# Patient Record
Sex: MALE | Race: WHITE | NOT HISPANIC OR LATINO | URBAN - METROPOLITAN AREA
[De-identification: names, ages, dates, MRNs, and addresses within clinical notes are randomized per-mention and may not be internally consistent; named-entity substitution may affect disease eponyms.]

---

## 2024-01-17 PROBLEM — K42.0 UMBILICAL HERNIA WITH OBSTRUCTION: Status: ACTIVE | Noted: 2024-01-17

## 2024-01-17 PROBLEM — K21.9 CHRONIC GERD: Status: ACTIVE | Noted: 2024-01-17

## 2024-01-17 PROBLEM — R19.5 LOOSE STOOLS: Status: ACTIVE | Noted: 2024-01-17

## 2024-01-17 PROBLEM — K55.9 ISCHEMIC COLITIS (MULTI): Status: ACTIVE | Noted: 2024-01-17

## 2024-01-17 PROBLEM — K22.10 EROSIVE ESOPHAGITIS: Status: ACTIVE | Noted: 2024-01-17

## 2024-01-17 PROBLEM — R10.12 LEFT UPPER QUADRANT PAIN: Status: ACTIVE | Noted: 2024-01-17

## 2024-01-17 PROBLEM — K57.92 ACUTE DIVERTICULITIS: Status: ACTIVE | Noted: 2024-01-17

## 2024-01-17 RX ORDER — OMEGA-3 FATTY ACIDS/FISH OIL 340-1000MG
2000 CAPSULE ORAL
COMMUNITY
End: 2024-01-18 | Stop reason: WASHOUT

## 2024-01-17 RX ORDER — MULTIVITAMIN
1 TABLET ORAL DAILY
COMMUNITY
End: 2024-01-18 | Stop reason: WASHOUT

## 2024-01-17 RX ORDER — DICYCLOMINE HYDROCHLORIDE 10 MG/1
CAPSULE ORAL 2 TIMES DAILY
COMMUNITY
Start: 2019-04-09 | End: 2024-01-18 | Stop reason: WASHOUT

## 2024-01-17 RX ORDER — TURMERIC 400 MG
CAPSULE ORAL
COMMUNITY
Start: 2022-06-22 | End: 2024-01-18 | Stop reason: WASHOUT

## 2024-01-17 RX ORDER — ESOMEPRAZOLE MAGNESIUM 40 MG/1
CAPSULE, DELAYED RELEASE ORAL
COMMUNITY

## 2024-01-18 ENCOUNTER — OFFICE VISIT (OUTPATIENT)
Dept: OTOLARYNGOLOGY | Facility: CLINIC | Age: 59
End: 2024-01-18
Payer: COMMERCIAL

## 2024-01-18 VITALS
TEMPERATURE: 97.7 F | HEIGHT: 75 IN | BODY MASS INDEX: 37.42 KG/M2 | DIASTOLIC BLOOD PRESSURE: 97 MMHG | WEIGHT: 301 LBS | HEART RATE: 91 BPM | SYSTOLIC BLOOD PRESSURE: 153 MMHG

## 2024-01-18 DIAGNOSIS — H93.13 TINNITUS OF BOTH EARS: ICD-10-CM

## 2024-01-18 DIAGNOSIS — H73.893 RETRACTION OF TYMPANIC MEMBRANE OF BOTH EARS: ICD-10-CM

## 2024-01-18 DIAGNOSIS — H93.8X3 SENSATION OF PLUGGED EAR ON BOTH SIDES: ICD-10-CM

## 2024-01-18 DIAGNOSIS — H65.92 FLUID LEVEL BEHIND TYMPANIC MEMBRANE OF LEFT EAR: Primary | ICD-10-CM

## 2024-01-18 PROCEDURE — 99213 OFFICE O/P EST LOW 20 MIN: CPT | Performed by: NURSE PRACTITIONER

## 2024-01-18 PROCEDURE — 1036F TOBACCO NON-USER: CPT | Performed by: NURSE PRACTITIONER

## 2024-01-18 PROCEDURE — 99203 OFFICE O/P NEW LOW 30 MIN: CPT | Performed by: NURSE PRACTITIONER

## 2024-01-18 SDOH — ECONOMIC STABILITY: FOOD INSECURITY: WITHIN THE PAST 12 MONTHS, YOU WORRIED THAT YOUR FOOD WOULD RUN OUT BEFORE YOU GOT MONEY TO BUY MORE.: NEVER TRUE

## 2024-01-18 SDOH — ECONOMIC STABILITY: FOOD INSECURITY: WITHIN THE PAST 12 MONTHS, THE FOOD YOU BOUGHT JUST DIDN'T LAST AND YOU DIDN'T HAVE MONEY TO GET MORE.: NEVER TRUE

## 2024-01-18 ASSESSMENT — ENCOUNTER SYMPTOMS
LOSS OF SENSATION IN FEET: 0
OCCASIONAL FEELINGS OF UNSTEADINESS: 0
DEPRESSION: 0

## 2024-01-18 ASSESSMENT — PATIENT HEALTH QUESTIONNAIRE - PHQ9
SUM OF ALL RESPONSES TO PHQ9 QUESTIONS 1 AND 2: 0
2. FEELING DOWN, DEPRESSED OR HOPELESS: NOT AT ALL
1. LITTLE INTEREST OR PLEASURE IN DOING THINGS: NOT AT ALL

## 2024-01-18 ASSESSMENT — COLUMBIA-SUICIDE SEVERITY RATING SCALE - C-SSRS
6. HAVE YOU EVER DONE ANYTHING, STARTED TO DO ANYTHING, OR PREPARED TO DO ANYTHING TO END YOUR LIFE?: NO
2. HAVE YOU ACTUALLY HAD ANY THOUGHTS OF KILLING YOURSELF?: NO
1. IN THE PAST MONTH, HAVE YOU WISHED YOU WERE DEAD OR WISHED YOU COULD GO TO SLEEP AND NOT WAKE UP?: NO

## 2024-01-18 ASSESSMENT — PAIN SCALES - GENERAL: PAINLEVEL: 0-NO PAIN

## 2024-01-18 NOTE — PROGRESS NOTES
Subjective   Patient ID: Kenn Powell is a 58 y.o. male who presents for Sinus Problem (Ears blocked).    HPI  His ears are clogged. This started bothering him after having the flu at the end of November. A couple of weeks was prescribed an antibiotic and then his ears started blocking up. Had some blood in the left ear and pain. Was told he had a ruptured ear drum and treated him with antibiotics and ear drops. Infection cleared but was still having congestion.   The left ear is blocked still, the right has got better. Sounds like he is under water. He is having tinnitus. He is having dizziness. No ear pain.   He uses saline spray and takes Claritin. No intranasal steroid spray.   He had a septoplasty years ago.    Patient Active Problem List   Diagnosis    Umbilical hernia with obstruction    Loose stools    Left upper quadrant pain    Ischemic colitis (CMS/HCC)    Chronic GERD    Erosive esophagitis    Acute diverticulitis     Past Surgical History:   Procedure Laterality Date    OTHER SURGICAL HISTORY  06/22/2022    Umbilical hernia repair    OTHER SURGICAL HISTORY  06/22/2022    Nasal septoplasty    OTHER SURGICAL HISTORY  06/22/2022    Lithotripsy    OTHER SURGICAL HISTORY  06/22/2022    Colonoscopy     Review of Systems    All other systems have been reviewed and are negative for complaints except for those mentioned in history of present illness, past medical history and problem list.    Objective   Physical Exam    Constitutional: No fever, chills, weight loss or weight gain  General appearance: Appears well, well-nourished, well groomed. No acute distress.    Communication: Normal communication    Psychiatric: Oriented to person, place and time. Normal mood and affect.    Neurologic: Cranial nerves II-XII grossly intact and symmetric bilaterally.    Head and Face:  Head: Atraumatic with no masses, lesions or scarring.  Face: Normal symmetry. No scars or deformities.  TMJ: Normal, no trismus.    Eyes:  Conjunctiva not edematous or erythematous.     Right Ear: External inspection of ear with no deformity, scars, or masses. EAC is clear.  TM is intact with no sign of infection, effusion. TM is slightly retracted. No perforation seen.     Left Ear: External inspection of ear with no deformity, scars, or masses. EAC is clear.  TM is intact with no sign of infection. There is maria alejandra colored fluid and fluid level lines noted. TM is also retracted. No perforation seen.     Nose: External inspection of nose: No nasal lesions, lacerations or scars. Anterior rhinoscopy with limited visualization past the inferior turbinates. No tenderness on frontal or maxillary sinus palpation.    Oral Cavity/Mouth: Oral cavity and oropharynx mucosa moist and pink. No lesions or masses. Dentition normal. Tonsils appear normal. Uvula is midline. Tongue with no masses or lesions. Tongue with good mobility. The oropharynx is clear.    Neck: Normal appearing, symmetric, trachea midline.     Cardiovascular: Examination of peripheral vascular system shows no clubbing or cyanosis.    Respiratory: No respiratory distress increased work of breathing. Inspection of the chest with symmetric chest expansion and normal respiratory effort.    Skin: No head and neck rashes.    Lymph nodes: No adenopathy.     Assessment/Plan   Diagnoses and all orders for this visit:  Fluid level behind tympanic membrane of left ear  Retraction of tympanic membrane of both ears  Sensation of plugged ear on both sides  Tinnitus of both ears  - I recommended Flonase 2 sprays each nostril once a day. Patient was given instruction on proper application of the medication by spraying intranasally and aiming towards the outer corners of the eyes. Patient was instructed to avoid the septum due to the risk of nasal bleeding.  -Begin use of Afrin.  Administer two sprays in each nostril twice per day.  Do this for 3 days in a row.  Take one day off.  Do for another 3 days in a row.   Take one day off.  Do for another 3 days in a row, and then stop the medication cycle.  If you do not take a break using Afrin after 3 days of use, rebound nasal congestion may occur.  - Perform exercises that help open the eustachian tube such as yawning, swallowing, and gentle autoinsufflation.   - Follow up in 6 weeks with audiogram and to see Dr. Dan. If left effusion not resolved, may need to perform myringotomy with or without PE tube. If symptoms resolving, he may follow back up with me.     All questions answered to patient satisfaction.        EILEEN Dawn-CNP 01/18/24 3:40 PM

## 2024-01-18 NOTE — PATIENT INSTRUCTIONS
- You have fluid and negative pressure behind you left ear drum. You also have some negative pressure behind your right ear drum.   -I recommended Flonase 2 sprays each nostril once a day. Patient was given instruction on proper application of the medication by spraying intranasally and aiming towards the outer corners of the eyes. Patient was instructed to avoid the septum due to the risk of nasal bleeding.  -Begin use of Afrin.  Administer two sprays in each nostril twice per day.  Do this for 3 days in a row.  Take one day off.  Do for another 3 days in a row.  Take one day off.  Do for another 3 days in a row, and then stop the medication cycle.  If you do not take a break using Afrin after 3 days of use, rebound nasal congestion may occur.  - Perform exercises that help open the eustachian tube such as yawning, swallowing, and gentle autoinsufflation.   - Follow up in 6 weeks with Dr. Dan with a hearing test before. May need myringotomy if fluid not resolved.     Welcome to Ivette Melgoza's clinic. We are here to assist you through your ENT care at [facility].  Ivette is a Nurse Practitioner who specializes in General ENT. This means that she specializes in taking care of patients with usual ENT issues such as nasal congestion, allergy symptoms, sinusitis, hearing loss, ear infections, ear wax removal, hoarseness, sore throat, throat infections, reflux and some swallowing issues. She also sees patients regarding dizziness and vertigo.   Lalitha is Ivette's  and she answers the office phone from 7:30am-4pm Mon-Fri. Call [phone]. She can help you with scheduling of appointments, and general questions and information. You may need to leave a message if she is helping another patient. In this case, someone from the team will call you back the same day if you leave your message before 3pm, or the next business morning.  Ivette currently sees patients at [facility]  [facility] on Mondays, Wednesdays and Thursdays.  She works closely with audiologists to solve issues with hearing. She is also in very close contact with her collaborative physicians. Dr. Fortune, a surgeon who specializes in general ENT and rhinology. She also works closely with otologist (ear surgeon) Dr. Dan and head and neck surgery Dr. Colmenares.   Others who may be included in your care are dieticians, social workers, allergists, gastroenterologists, neurologists, and physical therapists. Ivette will provide these referrals as needed. Please let her know if you would like to request a specific referral.  Ivette makes every effort to run on time for your appointments. Therefore, if you are more than 15 minutes late unrelated to a scan or another appointment such as therapy or audiology, your appointment will need to be rescheduled for another day. We appreciate your understanding.   We look forward to working with you to meet your healthcare goals.

## 2024-03-07 ENCOUNTER — OFFICE VISIT (OUTPATIENT)
Dept: OTOLARYNGOLOGY | Facility: CLINIC | Age: 59
End: 2024-03-07
Payer: COMMERCIAL

## 2024-03-07 ENCOUNTER — CLINICAL SUPPORT (OUTPATIENT)
Dept: AUDIOLOGY | Facility: CLINIC | Age: 59
End: 2024-03-07
Payer: COMMERCIAL

## 2024-03-07 VITALS
SYSTOLIC BLOOD PRESSURE: 158 MMHG | WEIGHT: 304 LBS | DIASTOLIC BLOOD PRESSURE: 102 MMHG | RESPIRATION RATE: 18 BRPM | HEART RATE: 81 BPM | BODY MASS INDEX: 37.02 KG/M2 | TEMPERATURE: 97.8 F | HEIGHT: 76 IN

## 2024-03-07 DIAGNOSIS — H69.92 DYSFUNCTION OF LEFT EUSTACHIAN TUBE: Primary | ICD-10-CM

## 2024-03-07 DIAGNOSIS — H90.A12 CONDUCTIVE HEARING LOSS OF LEFT EAR WITH RESTRICTED HEARING OF RIGHT EAR: ICD-10-CM

## 2024-03-07 DIAGNOSIS — H69.92 DISORDER OF LEFT EUSTACHIAN TUBE: ICD-10-CM

## 2024-03-07 DIAGNOSIS — H90.72 MIXED CONDUCTIVE AND SENSORINEURAL HEARING LOSS OF LEFT EAR WITH UNRESTRICTED HEARING OF RIGHT EAR: ICD-10-CM

## 2024-03-07 DIAGNOSIS — H73.892 RETRACTED TYMPANIC MEMBRANE, LEFT: Primary | ICD-10-CM

## 2024-03-07 PROCEDURE — 92557 COMPREHENSIVE HEARING TEST: CPT | Performed by: AUDIOLOGIST

## 2024-03-07 PROCEDURE — 99213 OFFICE O/P EST LOW 20 MIN: CPT | Performed by: STUDENT IN AN ORGANIZED HEALTH CARE EDUCATION/TRAINING PROGRAM

## 2024-03-07 PROCEDURE — 92567 TYMPANOMETRY: CPT | Performed by: AUDIOLOGIST

## 2024-03-07 PROCEDURE — 1036F TOBACCO NON-USER: CPT | Performed by: STUDENT IN AN ORGANIZED HEALTH CARE EDUCATION/TRAINING PROGRAM

## 2024-03-07 PROCEDURE — 92511 NASOPHARYNGOSCOPY: CPT | Performed by: STUDENT IN AN ORGANIZED HEALTH CARE EDUCATION/TRAINING PROGRAM

## 2024-03-07 ASSESSMENT — PAIN SCALES - GENERAL: PAINLEVEL: 0-NO PAIN

## 2024-03-07 NOTE — LETTER
March 8, 2024     Ivette Melgoza, APRN-CNP  [address]  [facility] [place] [facility] [place] [address]    Patient: Kenn Powell   YOB: 1965   Date of Visit: 3/7/2024       Dear Dr. Ivette Melgoza, APRN-CNP:    Thank you for referring Kenn Powell to me for evaluation. Below are my notes for this consultation.  If you have questions, please do not hesitate to call me. I look forward to following your patient along with you.       Sincerely,     Maximo Dan MD      CC: No Recipients  ______________________________________________________________________________________    CHIEF COMPLAINT:   Chief Complaint   Patient presents with   • Follow-up       HISTORY OF PRESENT ILLNESS: Kenn Powell is a 58 y.o. male who presents today with symptoms of left ear muffled hearing and obstruction.  He reports that this all started near the end of November.  He had significant pain behind his left eardrum.  He then had relief of pain with bloody drainage, presumably secondary to tympanic membrane perforation.  He felt that he continued to have muffled hearing out of the left ear.  He denies a history of ear tubes or ear surgery.  He saw Ivette Melgoza on January 18, 2024.  At that time, there was a middle ear effusion.  He was started on flonase at that time.  He feels that his ear is significantly improved since that time.      PAST MEDICAL HISTORY:   Past Medical History:   Diagnosis Date   • Other specified health status     No pertinent past medical history       PAST SURGICAL HISTORY:   Past Surgical History:   Procedure Laterality Date   • OTHER SURGICAL HISTORY  06/22/2022    Umbilical hernia repair   • OTHER SURGICAL HISTORY  06/22/2022    Nasal septoplasty   • OTHER SURGICAL HISTORY  06/22/2022    Lithotripsy   • OTHER SURGICAL HISTORY  06/22/2022    Colonoscopy       MEDICATIONS:   Current Outpatient Medications:   •  esomeprazole (NexIUM) 40 mg DR capsule, Take by mouth., Disp: ,  "Rfl:   •  esomeprazole magnesium (NEXIUM ORAL), ORAL, DAILY, Disp: , Rfl:     ALLERGIES:   Allergies   Allergen Reactions   • Amoxicillin Anaphylaxis   • Penicillin Anaphylaxis       SOCIAL HISTORY:   reports that he has quit smoking. His smoking use included cigarettes. He has a 10.00 pack-year smoking history. He has never used smokeless tobacco. He reports current alcohol use. He reports that he does not use drugs.    FAMILY HISTORY: family history is not on file.    REVIEW OF SYSTEMS  Review of Systems    PHYSICAL EXAM:    VITALS:   Vitals:    03/07/24 1555   BP: (!) 158/102   Pulse: 81   Resp: 18   Temp: 36.6 °C (97.8 °F)   TempSrc: Temporal   Weight: 138 kg (304 lb)   Height: 1.93 m (6' 4\")        GENERAL: healthy, alert, well developed, well nourished, no distress, cooperative, appears chronologic age    Communicates with normal voice and without hearing aids.    HEAD: atraumatic, normocephalic, no lesions    EYES: normal, PERRLA and EOM's intact    EARS:   Right ear demonstrates a patent external auditory canal with a normal tympanic membrane.    Left ear: demonstrates a patent external auditory canal with a globally retracted tympanic membrane.  There is also a shallow attic retraction.  I can see the full extent of all retraction.  There is no effusion.  Further, he is able to auto insufflate.  Antunez tuning fork test lateralizes midline 512 Hz.   Rinne testing with a 512 Hz tuning fork: Right Air conduction > Bone conduction   Left Air conduction > Bone conduction      NOSE: nose shows no deformity, asymmetry, or inflammation, nasal mucosa normal,     ORAL CAVITY/OROPHARYNX: negative findings: lips normal without lesions, buccal mucosa normal, gums healthy, teeth intact, non-carious, palate normal, tongue midline and normal, soft palate, uvula, and tonsils normal    NECK AND SALIVARY GLANDS: Neck is supple without masses or lymphadenopathy. Palpation of the parotid and submandibular gland reveals no masses " or tenderness to palpation.    CRANIAL NERVES: intact,   Facial nerve exam  is House - Brackmann 1- Normal Function on the right and 1- Normal Function on the left.    CARDIOVASCULAR: No evidence of peripheral edema    PULMONARY: normal lung excursion, no evidence of retractions    DATA REVIEWED:  Audiogram  I reviewed the audiogram from 3/7/2024, which demonstrates normal right hearing with mild very high-frequency sensorineural hearing loss at 8000 Hz only.  On the left, there was mild low-frequency mixed hearing loss rising to normal hearing then sloping to mild sensorineural hearing loss at 8000 DB.  There was a type As tympanogram on the right and a type C tympanogram on the left.  Word recognition score was 100% bilaterally.    Procedure note   Diagnosis: Unilateral Eustachian tube dysfunction  Procedure: Flexible fiberoptic nasopharyngoscopy   : Maximo Dan MD   Procedure: After verbal consent was obtained. Topical afrin and 4% lidocaine was administered via a nebulizer in each nostril. A flexible nasopharyngoscope used to examine both the right and left nasal cavity and the nasopharynx. Findings were as follows: No nasopharyngeal masses or obstruction of the Eustachian tube orifice.      IMPRESSION:   1) left eustachian tube dysfunction and left mixed hearing loss --it appears that the left eustachian tube dysfunction is improving significantly with Flonase.  The patient does not have a middle ear effusion.  The conductive component of the hearing loss is minimal.  Further, the patient is able to auto insufflate.    PLAN:  I discussed options for management of this left eustachian tube dysfunction.  I discussed myringotomy with tube placement.  I discussed the risks and benefits of the procedure including, not limited to, bleeding, pain, infection, tympanic membrane perforation, hearing loss, fullness, need for further procedures.  I also discussed that we could continue medical management with  Flonase given that he has had significant improvement, his audiogram shows minimal conductive hearing loss, and he is able to auto insufflate.  Further, I can see the full extent of all retractions.  I do not think he is developing cholesteatoma.  He would like to proceed with this option.  He will follow-up in 6 weeks to see if he is still having symptoms.  If he is still having symptoms, we will consider a left myringotomy with tube placement at that time.    Maximo Dan MD

## 2024-03-07 NOTE — PROGRESS NOTES
"AUDIOLOGY ADULT AUDIOMETRIC EVALUATION      Name:  Kenn Powell  :  1965  Age:  58 y.o.  Date of Evaluation:  3/7/2024    HISTORY  Reason for visit:  ears plugged  Mr. Powell is seen 3/7/2024 at the request of Ivette Melgoza CNP for an evaluation of hearing.   Patient is seeing Maximo Dan M.D. today.      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    RESULTS   Otoscopic Evaluation: minimal cerumen, hair bilaterally      Immittance Measures (226 Hz probe tone):     Right ear:  Tympanometry is consistent with normal middle ear pressure and normal tympanic membrane mobility.     Left ear: tympanometry is consistent with low middle ear pressure and normal tympanic membrane mobility.    Ipsilateral acoustic reflexes (500-4000 Hz) are present for the right ear for 500-4000 Hz and could not be tested for the left ear due to inadequate seal    Test technique:  standard behavioral technique via insert earphones.  Reliability is good.    Pure Tone Audiometry:  Hearing sensitivity is in the normal hearing to mild hearing loss range bilaterally.   Note left air-bone gaps for 500-1000 Hz.      Speech Audiometry:        Right Ear:  Speech Reception Threshold (SRT) was obtained at 10 dBHL                 Speech " discrimination score was 100% in quiet when words were presented at 60 dBHL      Left Ear:  Speech Reception Threshold (SRT) was obtained at 25 dBHL                 Speech discrimination score was 100% in quiet when words were presented at 75 dBHL    IMPRESSIONS:  Patient is expected to have communication difficulty in adverse listening environments.    Patient is expected to benefit from effective communication strategies.       RECOMMENDATIONS  Continue with otologic follow-up with Maximo Dan M.D.   Reassess hearing in conjunction with medical management.    Continue with medical follow-up as indicated.       PATIENT EDUCATION  Discussed results and recommendations with patient.  Questions were addressed and the patient was encouraged to contact our department should concerns arise.       EDITA García, CCC-A  Licensed Audiologist

## 2024-03-08 NOTE — PROGRESS NOTES
CHIEF COMPLAINT:   Chief Complaint   Patient presents with    Follow-up       HISTORY OF PRESENT ILLNESS: Kenn Powell is a 58 y.o. male who presents today with symptoms of left ear muffled hearing and obstruction.  He reports that this all started near the end of November.  He had significant pain behind his left eardrum.  He then had relief of pain with bloody drainage, presumably secondary to tympanic membrane perforation.  He felt that he continued to have muffled hearing out of the left ear.  He denies a history of ear tubes or ear surgery.  He saw Ivette Melgoza on January 18, 2024.  At that time, there was a middle ear effusion.  He was started on flonase at that time.  He feels that his ear is significantly improved since that time.      PAST MEDICAL HISTORY:   Past Medical History:   Diagnosis Date    Other specified health status     No pertinent past medical history       PAST SURGICAL HISTORY:   Past Surgical History:   Procedure Laterality Date    OTHER SURGICAL HISTORY  06/22/2022    Umbilical hernia repair    OTHER SURGICAL HISTORY  06/22/2022    Nasal septoplasty    OTHER SURGICAL HISTORY  06/22/2022    Lithotripsy    OTHER SURGICAL HISTORY  06/22/2022    Colonoscopy       MEDICATIONS:   Current Outpatient Medications:     esomeprazole (NexIUM) 40 mg DR capsule, Take by mouth., Disp: , Rfl:     esomeprazole magnesium (NEXIUM ORAL), ORAL, DAILY, Disp: , Rfl:     ALLERGIES:   Allergies   Allergen Reactions    Amoxicillin Anaphylaxis    Penicillin Anaphylaxis       SOCIAL HISTORY:   reports that he has quit smoking. His smoking use included cigarettes. He has a 10.00 pack-year smoking history. He has never used smokeless tobacco. He reports current alcohol use. He reports that he does not use drugs.    FAMILY HISTORY: family history is not on file.    REVIEW OF SYSTEMS  Review of Systems    PHYSICAL EXAM:    VITALS:   Vitals:    03/07/24 1555   BP: (!) 158/102   Pulse: 81   Resp: 18   Temp: 36.6 °C  "(97.8 °F)   TempSrc: Temporal   Weight: 138 kg (304 lb)   Height: 1.93 m (6' 4\")        GENERAL: healthy, alert, well developed, well nourished, no distress, cooperative, appears chronologic age    Communicates with normal voice and without hearing aids.    HEAD: atraumatic, normocephalic, no lesions    EYES: normal, PERRLA and EOM's intact    EARS:   Right ear demonstrates a patent external auditory canal with a normal tympanic membrane.    Left ear: demonstrates a patent external auditory canal with a globally retracted tympanic membrane.  There is also a shallow attic retraction.  I can see the full extent of all retraction.  There is no effusion.  Further, he is able to auto insufflate.  Antunez tuning fork test lateralizes midline 512 Hz.   Rinne testing with a 512 Hz tuning fork: Right Air conduction > Bone conduction   Left Air conduction > Bone conduction      NOSE: nose shows no deformity, asymmetry, or inflammation, nasal mucosa normal,     ORAL CAVITY/OROPHARYNX: negative findings: lips normal without lesions, buccal mucosa normal, gums healthy, teeth intact, non-carious, palate normal, tongue midline and normal, soft palate, uvula, and tonsils normal    NECK AND SALIVARY GLANDS: Neck is supple without masses or lymphadenopathy. Palpation of the parotid and submandibular gland reveals no masses or tenderness to palpation.    CRANIAL NERVES: intact,   Facial nerve exam  is House - Brackmann 1- Normal Function on the right and 1- Normal Function on the left.    CARDIOVASCULAR: No evidence of peripheral edema    PULMONARY: normal lung excursion, no evidence of retractions    DATA REVIEWED:  Audiogram  I reviewed the audiogram from 3/7/2024, which demonstrates normal right hearing with mild very high-frequency sensorineural hearing loss at 8000 Hz only.  On the left, there was mild low-frequency mixed hearing loss rising to normal hearing then sloping to mild sensorineural hearing loss at 8000 DB.  There was a " type As tympanogram on the right and a type C tympanogram on the left.  Word recognition score was 100% bilaterally.    Procedure note   Diagnosis: Unilateral Eustachian tube dysfunction  Procedure: Flexible fiberoptic nasopharyngoscopy   : Maximo Dan MD   Procedure: After verbal consent was obtained. Topical afrin and 4% lidocaine was administered via a nebulizer in each nostril. A flexible nasopharyngoscope used to examine both the right and left nasal cavity and the nasopharynx. Findings were as follows: No nasopharyngeal masses or obstruction of the Eustachian tube orifice.      IMPRESSION:   1) left eustachian tube dysfunction and left mixed hearing loss --it appears that the left eustachian tube dysfunction is improving significantly with Flonase.  The patient does not have a middle ear effusion.  The conductive component of the hearing loss is minimal.  Further, the patient is able to auto insufflate.    PLAN:  I discussed options for management of this left eustachian tube dysfunction.  I discussed myringotomy with tube placement.  I discussed the risks and benefits of the procedure including, not limited to, bleeding, pain, infection, tympanic membrane perforation, hearing loss, fullness, need for further procedures.  I also discussed that we could continue medical management with Flonase given that he has had significant improvement, his audiogram shows minimal conductive hearing loss, and he is able to auto insufflate.  Further, I can see the full extent of all retractions.  I do not think he is developing cholesteatoma.  He would like to proceed with this option.  He will follow-up in 6 weeks to see if he is still having symptoms.  If he is still having symptoms, we will consider a left myringotomy with tube placement at that time.    Maximo Dan MD

## 2024-04-18 ENCOUNTER — APPOINTMENT (OUTPATIENT)
Dept: OTOLARYNGOLOGY | Facility: CLINIC | Age: 59
End: 2024-04-18
Payer: COMMERCIAL

## 2024-07-01 ENCOUNTER — HOSPITAL ENCOUNTER (OUTPATIENT)
Dept: RADIOLOGY | Facility: CLINIC | Age: 59
Discharge: HOME | End: 2024-07-01
Payer: COMMERCIAL

## 2024-07-01 ENCOUNTER — APPOINTMENT (OUTPATIENT)
Dept: ORTHOPEDIC SURGERY | Facility: CLINIC | Age: 59
End: 2024-07-01
Payer: COMMERCIAL

## 2024-07-01 DIAGNOSIS — M54.16 LUMBAR RADICULITIS: ICD-10-CM

## 2024-07-01 DIAGNOSIS — M47.814 THORACIC SPONDYLOSIS: ICD-10-CM

## 2024-07-01 DIAGNOSIS — M47.816 LUMBAR SPONDYLOSIS: ICD-10-CM

## 2024-07-01 DIAGNOSIS — M47.816 LUMBAR SPONDYLOSIS: Primary | ICD-10-CM

## 2024-07-01 DIAGNOSIS — M62.830 BACK MUSCLE SPASM: ICD-10-CM

## 2024-07-01 PROCEDURE — 1036F TOBACCO NON-USER: CPT | Performed by: PHYSICAL MEDICINE & REHABILITATION

## 2024-07-01 PROCEDURE — 99204 OFFICE O/P NEW MOD 45 MIN: CPT | Performed by: PHYSICAL MEDICINE & REHABILITATION

## 2024-07-01 PROCEDURE — 72070 X-RAY EXAM THORAC SPINE 2VWS: CPT | Performed by: RADIOLOGY

## 2024-07-01 PROCEDURE — 72110 X-RAY EXAM L-2 SPINE 4/>VWS: CPT | Performed by: RADIOLOGY

## 2024-07-01 PROCEDURE — 72110 X-RAY EXAM L-2 SPINE 4/>VWS: CPT

## 2024-07-01 PROCEDURE — 72070 X-RAY EXAM THORAC SPINE 2VWS: CPT

## 2024-07-01 RX ORDER — METHOCARBAMOL 500 MG/1
TABLET, FILM COATED ORAL
Qty: 60 TABLET | Refills: 1 | Status: SHIPPED | OUTPATIENT
Start: 2024-07-01

## 2024-07-01 RX ORDER — MELOXICAM 15 MG/1
15 TABLET ORAL DAILY PRN
Qty: 30 TABLET | Refills: 1 | Status: SHIPPED | OUTPATIENT
Start: 2024-07-01 | End: 2024-07-31

## 2024-07-01 SDOH — SOCIAL STABILITY: SOCIAL NETWORK: SOCIAL ACTIVITY:: 5

## 2024-07-01 NOTE — PROGRESS NOTES
New Consult/New Patient Note    7/1/2024   No ref. provider found    Assessment: 59-year-old male with acute on chronic bilateral lower back pain  -Suspect lumbar or thoracic discogenic pain with intermittent radiculitis  -Possible component of stenosis    PLAN:  1)  Imaging/Diagnostic Studies: Obtain thoracic and lumbar x-rays to further assess.  Reviewed abdominal CT scan from 2022 which does show mild disc protrusion at L4-5 as well as degenerative disc disease at L5-S1 and thoracic spondylosis  2)  Therapy/Rehabilitation: New consult provided for physical therapy  3)  Pharmacological Management: New Rx provided for meloxicam and Robaxin as needed.  Understands to avoid other NSAIDs and the potential sedate of side effects.  4)  Spine/Surgical Interventions: None at this time.  Advised that he should go to the emergency department or seek surgical care if his pain becomes severe or he has any worsening neurological deficits  5)  Alternative Treatments: May consider alternative treatment options in the future including manipulation (chiropractor versus osteopathic) and/or acupuncture if patient does not obtain optimal relief with initial treatment plan.  6)  Consultations:  physical therapy  7)  Follow -up: 4-6 weeks or PRN if symptoms worsen/do not improve.   8)  Future treatment considerations: Thoracic or lumbar MRI to further assess    Patient advised of the difference between hurt and harm and advised to continue with all normal activities and exercises. Patient verbalized understanding of the above plan and was happy with the care provided.      The above clinical summary has been dictated with voice recognition software. It has not been proofread for grammatical errors, typographical mistakes, or other semantic inconsistencies.    Thank you for visiting our office today. It was our pleasure to take part in your healthcare.     Do not hesitate to call with any questions regarding your plan of care after  leaving at [phone]    To clinicians, thank you very much for this kind referral. It is a privilege to partner with you in the care of your patients. My office would be delighted to assist you with any further consultations or with questions regarding the plan of care outlined. Do not hesitate to call the office or contact me directly.     Sincerely,    NABIL Baez MD  , Physical Medicine and Rehabilitation, Orthopedic Spine  [facility] [employer]  [facility] [place]         Kenn Powell   is a 59 y.o. male who presents with about a month of upper to mid back pain  Location:  thoracolumbar pain, right worse than left  Radiation:  notes some tingling and numbness into both legs somewhat diffusely.  Also with similar pain.   Quality:  burning, stabbing  current 4/10,  at its worst 10/10  Exacerbated by twisting and bending  Relieved by rest  Onset, traumatic event: no recent  Has tried:  HEP, oral steroid taper     Valsalva sign is pos  Grocery cart sign is pos  Smoker:  no  Does wake them at night  Litigation: no    Patient denies bowel/bladder incontinence, denies fever, denies unintentional weight loss, denies clumsiness of hands, feet, or dropping things.  Denies any constitutional or myelopathic symptomatology.      PREVIOUS TREATMENTS  IN THE LAST SIX MONTHS     Active conservative therapy  in the last six months (see below)              1. Physical therapy:  no                                                                                2. Home exercise program after PT yes:                                                      3. A physician supervised home exercise program (HEP):  no              4. Chiropractic Care: prior                                                                 Passive conservative therapy  in the last six months (see below)              1. NSAIDS:  ibuprofen                                                                                                      2. Prescription pain medication: none                                                           3. Acupuncture:  prior                                                                                          4. Tens unit: no     Assistive Devices: none    Work status:       ROS: Other than listed in HPI, PMHX below, and intake paperwork including a 30 point patient-recorded review of symptoms which was personally reviewed and inclusive of no history of unintentional weight loss, change in appetite, significant malaise, fevers, chills, or change in bowel/bladder, shortness of breath, or chest pain.    I have confirmed and edited as necessary Past Medical, Past Surgical, Family, Social History and ROS as obtained by others. These were also obtained on new patient forms.      PHYSICAL EXAM:   GENERAL APPEARANCE:  Well nourished, well developed, and no apparent distress.  NEURO PSYCH: Patient oriented to person, place, Mood pleasant. Benign affect.  MUSCULOSKELETAL and NEUROLOGICAL       VISUAL INSPECTION          THORACIC: WNL           LUMBAR: WNL  SPINE ROM:   LUMBAR ROM: Very limited diffusely      PALPATION:           SPINOUS PROCESS: Nontender midline           PARASPINALS: Tender to bilateral thoracic and lumbar, right worse than left  FACET LOADING: Positive bilateral lumbar  MUSCLE BULK: Normal and symmetrical in the upper & lower extremities.  MUSCLE TONE: Normal  MOTOR: 5/5 in all muscle groups tested in bilateral lower extremities   SENSORY: Normal sensory exam to light touch in the lower extremities  GAIT: Normal.  Able to go up and heels and toes with no sig. weakness.  No sig. balance deficit appreciated  Slump testing: Negative bilateral  PERIPHERAL JOINT ROM:   HIP ROM: Full bilaterally  ROGER/Thigh Thrust/Compression/Courtney Finger:  Negative bilaterally   Hip Exam including thigh thrust and LOG ROLL: Negative bilaterally    DATA REVIEW:   The below  imaging studies were personally reviewed and discussed with the patient.    Medical Decision Making:  The above note constitutes a Moderate to High level of medical decision making based on past data and imaging review, new and chronic symptoms with exacerbation, change in weakness or sensation, new imaging and diagnostic studies ordered, discussion of potential interventional or surgical treatment options, acute or chronic pain that may pose a threat to bodily function.    Past Medical History:   Diagnosis Date    Other specified health status     No pertinent past medical history       Medication Documentation Review Audit       Reviewed by Devon Baez MD (Physician) on 07/01/24 at 1415      Medication Order Taking? Sig Documenting Provider Last Dose Status   esomeprazole (NexIUM) 40 mg DR capsule [id number] No Take by mouth. Historical Provider, MD Taking Active   esomeprazole magnesium (NEXIUM ORAL) [id number] No ORAL, DAILY Historical Provider, MD Taking Active                    Allergies   Allergen Reactions    Amoxicillin Anaphylaxis    Penicillin Anaphylaxis       Social History     Socioeconomic History    Marital status:      Spouse name: Not on file    Number of children: Not on file    Years of education: Not on file    Highest education level: Not on file   Occupational History    Not on file   Tobacco Use    Smoking status: Former     Current packs/day: 1.00     Average packs/day: 1 pack/day for 10.0 years (10.0 ttl pk-yrs)     Types: Cigarettes    Smokeless tobacco: Never    Tobacco comments:     Smoked from age [age]   Substance and Sexual Activity    Alcohol use: Yes     Comment: occasional    Drug use: Never    Sexual activity: Not on file   Other Topics Concern    Not on file   Social History Narrative    Not on file     Social Determinants of Health     Financial Resource Strain: Not on file   Food Insecurity: No Food Insecurity (1/18/2024)    Hunger Vital Sign     Worried About  Running Out of Food in the Last Year: Never true     Ran Out of Food in the Last Year: Never true   Transportation Needs: Not on file   Physical Activity: Not on file   Stress: Not on file   Social Connections: Not on file   Intimate Partner Violence: Not on file   Housing Stability: Not on file       Past Surgical History:   Procedure Laterality Date    OTHER SURGICAL HISTORY  06/22/2022    Umbilical hernia repair    OTHER SURGICAL HISTORY  06/22/2022    Nasal septoplasty    OTHER SURGICAL HISTORY  06/22/2022    Lithotripsy    OTHER SURGICAL HISTORY  06/22/2022    Colonoscopy

## 2024-07-09 ENCOUNTER — APPOINTMENT (OUTPATIENT)
Dept: SURGERY | Facility: CLINIC | Age: 59
End: 2024-07-09
Payer: COMMERCIAL

## 2024-07-09 VITALS
HEIGHT: 76 IN | HEART RATE: 78 BPM | WEIGHT: 308 LBS | TEMPERATURE: 98.2 F | OXYGEN SATURATION: 93 % | SYSTOLIC BLOOD PRESSURE: 172 MMHG | BODY MASS INDEX: 37.51 KG/M2 | RESPIRATION RATE: 14 BRPM | DIASTOLIC BLOOD PRESSURE: 102 MMHG

## 2024-07-09 DIAGNOSIS — M79.18 MUSCULOSKELETAL PAIN: ICD-10-CM

## 2024-07-09 DIAGNOSIS — K42.0 UMBILICAL HERNIA WITH OBSTRUCTION: Primary | ICD-10-CM

## 2024-07-09 PROCEDURE — 99213 OFFICE O/P EST LOW 20 MIN: CPT | Performed by: SURGERY

## 2024-07-10 NOTE — PROGRESS NOTES
History Of Present Illness  HPI patient evaluated today for periumbilical pain.  Patient underwent repair of an incarcerated umbilical hernia approximately 2 years ago without mesh.  He had an uneventful postoperative recovery.  He was informed of the increased risk of recurrence secondary to elevated BMI and failure to use mesh under those circumstances.  He denies any abdominal mass still complains of pulling pain especially with exertion and Valsalva movements.  He is currently undergoing physical therapy for his back and is concerned that exercises may exacerbate or cause a hernia.     Past Medical History  He has a past medical history of Other specified health status.    Surgical History  He has a past surgical history that includes Other surgical history (06/22/2022); Other surgical history (06/22/2022); Other surgical history (06/22/2022); and Other surgical history (06/22/2022).     Social History  He reports that he has quit smoking. His smoking use included cigarettes. He has a 10 pack-year smoking history. He has never used smokeless tobacco. He reports current alcohol use. He reports that he does not use drugs.    Family History  No family history on file.     Allergies  Amoxicillin and Penicillin    Review of Systems 10 review of systems otherwise negative     Visit Vitals  BP (!) 172/102   Pulse 78   Temp 36.8 °C (98.2 °F) (Temporal)   Resp 14        Physical Exam BMI 37.4  GENERAL  MENTAL STATUS - Alert. GENERAL APPEARANCE - Cooperative and well groomed. ORIENTATION - Oriented X4. BUILD & NUTRITION - Well nourished and well developed. HYDRATION - Well hydrated.    INTEGUMENTARY  GENERAL CHARACTERISTICS - Overall examination of the patient´s skin reveals no rashes. COLOR - not icteric. SKIN MOISTURE - normal skin moisture. TEMPERATURE - normal worth is noted.    HEAD AND NECK  HEAD  HEAD SHAPE - Atraumatic and normocephalic.  TRACHEA - midline.  THYROID  GLAND CHARACTERISTICS - normal size and  "consistency and no palpable nodules.    EYE  SCLERA/CONJUNCTIVA - BILATERAL - Anicteric.    CHEST AND LUNG EXAM  AUSCULTATION -  BREATH SOUNDS - Clear.    BREAST - Did not examine.    CARDIOVASCULAR  AUSCULTATION: RHYTHM - Regular. HEART SOUNDS - Normal heart sounds.  MURMURS & OTHER HEART SOUNDS - Auscultation of the heart reveals regular rate and no murmurs.    ABDOMEN  PALPATION/PERCUSSION - Palpation and percussion of the abdomen reveal soft, non tender, no mass and no hepatosplenomegaly.  Infraumbilical scar.  No signs of recurrent umbilical hernia.  No signs of inguinal hernia.    LYMPHATIC  GENERAL LYMPHATICS  BREAST LYMPHATIC EXAM - No cervical adenopathy, supraclavicular adenopathy or axilliary adenopathy.     Extremities without edema    Last Recorded Vitals  Blood pressure (!) 172/102, pulse 78, temperature 36.8 °C (98.2 °F), temperature source Temporal, resp. rate 14, height 1.93 m (6' 4\"), weight 140 kg (308 lb), SpO2 93%.    Relevant Results      XR lumbar spine complete 4+ views    Result Date: 7/2/2024  Interpreted By:  Freddy Rubio, STUDY: XR LUMBAR SPINE COMPLETE 4+ VIEWS; ;  7/1/2024 3:14 pm   INDICATION: Signs/Symptoms:bilateral lower back pain.   COMPARISON: None.   ACCESSION NUMBER(S): [id number]   ORDERING CLINICIAN: ESCOBAR GARCIA   FINDINGS: Lumbar spine, 4 views   There is no fracture. There is no spondylolisthesis. There is moderate facet disease in the lower lumbar spine. Otherwise there is no disc space narrowing or osteophytosis. The prevertebral soft tissues are within normal limits.       Moderate facet disease lower lumbar spine. No acute abnormality   MACRO: None   Signed by: Freddy Rubio 7/2/2024 7:56 PM Dictation workstation:   [id number]    XR thoracic spine 2 views    Result Date: 7/2/2024  Interpreted By:  Freddy Rubio, STUDY: XR THORACIC SPINE 2 VIEWS; ;  7/1/2024 3:11 pm   INDICATION: Signs/Symptoms:mid to lower back pain.   COMPARISON: None.   ACCESSION " NUMBER(S): [id number]   ORDERING CLINICIAN: ESCOBAR GARCIA   FINDINGS: Thoracic spine, two views   There is mild kyphosis of the thoracic spine. There is ossification of the anterior longitudinal ligament in the midthoracic spine consistent with diffuse idiopathic skeletal hyperostosis. No fracture seen. No spondylolisthesis       Diffuse idiopathic skeletal hyperostosis. Kyphotic changes. No definite fracture seen. In the setting of trauma however recommend CT for complete evaluation     MACRO: None   Signed by: Freddy Rubio 7/2/2024 7:55 PM Dictation workstation:   [id number]        @[email or web address] [email or web address]@    Assessment/Plan       Clinically without signs of recurrence.  Advised patient to fully participate in physical therapy.  Advised to continue with self-exam and discussed the findings of a potential hernia.  Advised to seek immediate medical attention for any painful persistent mass over the abdominal wall.  Follow-up as needed mass discomfort       I spent 20 minutes in the professional and overall care of this patient.      Kwasi Zheng MD

## 2024-07-10 NOTE — PATIENT INSTRUCTIONS
Thank you for choosing [facility].  Today's exam reveals no evidence of recurrence of your umbilical hernia.  I recommend full participation in any physical therapy.  Please seek medical attention for any mass that you feel over the abdominal wall.  Please seek immediate medical attention for any painful  consistent with mass over the abdominal wall that cannot be returned to the abdominal cavity.  You may follow-up in the office for any concerns.

## 2024-07-12 ENCOUNTER — APPOINTMENT (OUTPATIENT)
Dept: PRIMARY CARE | Facility: CLINIC | Age: 59
End: 2024-07-12
Payer: COMMERCIAL

## 2024-07-12 VITALS — BODY MASS INDEX: 36.76 KG/M2 | WEIGHT: 302 LBS | SYSTOLIC BLOOD PRESSURE: 162 MMHG | DIASTOLIC BLOOD PRESSURE: 94 MMHG

## 2024-07-12 DIAGNOSIS — Z12.5 ENCOUNTER FOR SCREENING FOR MALIGNANT NEOPLASM OF PROSTATE: ICD-10-CM

## 2024-07-12 DIAGNOSIS — I10 ELEVATED BLOOD PRESSURE READING IN OFFICE WITH DIAGNOSIS OF HYPERTENSION: Primary | ICD-10-CM

## 2024-07-12 DIAGNOSIS — M47.816 LUMBAR SPONDYLOSIS: ICD-10-CM

## 2024-07-12 PROCEDURE — 3077F SYST BP >= 140 MM HG: CPT | Performed by: FAMILY MEDICINE

## 2024-07-12 PROCEDURE — 99203 OFFICE O/P NEW LOW 30 MIN: CPT | Performed by: FAMILY MEDICINE

## 2024-07-12 PROCEDURE — 3080F DIAST BP >= 90 MM HG: CPT | Performed by: FAMILY MEDICINE

## 2024-07-12 NOTE — PROGRESS NOTES
Subjective   Patient ID: Kenn Powell is a 59 y.o. male who presents for Establish Care (3 weeks ago had pain and numbness in the legs. Hx of bulging disc. /Sciatica pain./Back pain.).    HPI   Back pain  Umblical hernia    Schedued with PT for     Review of Systems   All other systems reviewed and are negative.      Objective   BP (!) 162/94   Wt 137 kg (302 lb)   BMI 36.76 kg/m²     Physical Exam  Vitals and nursing note reviewed.   Cardiovascular:      Rate and Rhythm: Normal rate and regular rhythm.   Pulmonary:      Effort: Pulmonary effort is normal.      Breath sounds: Normal breath sounds.   Musculoskeletal:      Cervical back: Neck supple.      Lumbar back: Tenderness present.   Lymphadenopathy:      Cervical: No cervical adenopathy.   Neurological:      Mental Status: He is alert.         Assessment/Plan   Diagnoses and all orders for this visit:  Elevated blood pressure reading in office with diagnosis of hypertension  Comments:  DASH diet.  Orders:  -     CBC and Auto Differential; Future  -     Lipid panel; Future  -     Comprehensive Metabolic Panel; Future  -     Hemoglobin A1c; Future  -     TSH; Future  -     Follow Up In Primary Care - Established; Future  Encounter for screening for malignant neoplasm of prostate  -     PSA; Future  Lumbar spondylosis  Comments:  scheduled for PT, follows with Ortho

## 2024-07-16 ENCOUNTER — LAB (OUTPATIENT)
Dept: LAB | Facility: LAB | Age: 59
End: 2024-07-16
Payer: COMMERCIAL

## 2024-07-16 DIAGNOSIS — Z12.5 ENCOUNTER FOR SCREENING FOR MALIGNANT NEOPLASM OF PROSTATE: ICD-10-CM

## 2024-07-16 DIAGNOSIS — I10 ELEVATED BLOOD PRESSURE READING IN OFFICE WITH DIAGNOSIS OF HYPERTENSION: ICD-10-CM

## 2024-07-16 LAB
ALBUMIN SERPL BCP-MCNC: 4.5 G/DL (ref 3.4–5)
ALP SERPL-CCNC: 98 U/L (ref 33–120)
ALT SERPL W P-5'-P-CCNC: 107 U/L (ref 10–52)
ANION GAP SERPL CALC-SCNC: 11 MMOL/L (ref 10–20)
AST SERPL W P-5'-P-CCNC: 51 U/L (ref 9–39)
BASOPHILS # BLD AUTO: 0.03 X10*3/UL (ref 0–0.1)
BASOPHILS NFR BLD AUTO: 0.6 %
BILIRUB SERPL-MCNC: 0.8 MG/DL (ref 0–1.2)
BUN SERPL-MCNC: 15 MG/DL (ref 6–23)
CALCIUM SERPL-MCNC: 9.2 MG/DL (ref 8.6–10.3)
CHLORIDE SERPL-SCNC: 104 MMOL/L (ref 98–107)
CHOLEST SERPL-MCNC: 220 MG/DL (ref 0–199)
CHOLESTEROL/HDL RATIO: 4.3
CO2 SERPL-SCNC: 29 MMOL/L (ref 21–32)
CREAT SERPL-MCNC: 1 MG/DL (ref 0.5–1.3)
EGFRCR SERPLBLD CKD-EPI 2021: 88 ML/MIN/1.73M*2
EOSINOPHIL # BLD AUTO: 0.13 X10*3/UL (ref 0–0.7)
EOSINOPHIL NFR BLD AUTO: 2.6 %
ERYTHROCYTE [DISTWIDTH] IN BLOOD BY AUTOMATED COUNT: 12.6 % (ref 11.5–14.5)
EST. AVERAGE GLUCOSE BLD GHB EST-MCNC: 123 MG/DL
GLUCOSE SERPL-MCNC: 110 MG/DL (ref 74–99)
HBA1C MFR BLD: 5.9 %
HCT VFR BLD AUTO: 47.7 % (ref 41–52)
HDLC SERPL-MCNC: 51.3 MG/DL
HGB BLD-MCNC: 16.3 G/DL (ref 13.5–17.5)
IMM GRANULOCYTES # BLD AUTO: 0.03 X10*3/UL (ref 0–0.7)
IMM GRANULOCYTES NFR BLD AUTO: 0.6 % (ref 0–0.9)
LDLC SERPL CALC-MCNC: 146 MG/DL
LYMPHOCYTES # BLD AUTO: 1.78 X10*3/UL (ref 1.2–4.8)
LYMPHOCYTES NFR BLD AUTO: 35.7 %
MCH RBC QN AUTO: 30.4 PG (ref 26–34)
MCHC RBC AUTO-ENTMCNC: 34.2 G/DL (ref 32–36)
MCV RBC AUTO: 89 FL (ref 80–100)
MONOCYTES # BLD AUTO: 0.39 X10*3/UL (ref 0.1–1)
MONOCYTES NFR BLD AUTO: 7.8 %
NEUTROPHILS # BLD AUTO: 2.62 X10*3/UL (ref 1.2–7.7)
NEUTROPHILS NFR BLD AUTO: 52.7 %
NON HDL CHOLESTEROL: 169 MG/DL (ref 0–149)
NRBC BLD-RTO: 0 /100 WBCS (ref 0–0)
PLATELET # BLD AUTO: 193 X10*3/UL (ref 150–450)
POTASSIUM SERPL-SCNC: 4.5 MMOL/L (ref 3.5–5.3)
PROT SERPL-MCNC: 6.9 G/DL (ref 6.4–8.2)
PSA SERPL-MCNC: 1.02 NG/ML
RBC # BLD AUTO: 5.36 X10*6/UL (ref 4.5–5.9)
SODIUM SERPL-SCNC: 139 MMOL/L (ref 136–145)
TRIGL SERPL-MCNC: 113 MG/DL (ref 0–149)
TSH SERPL-ACNC: 2.3 MIU/L (ref 0.44–3.98)
VLDL: 23 MG/DL (ref 0–40)
WBC # BLD AUTO: 5 X10*3/UL (ref 4.4–11.3)

## 2024-07-16 PROCEDURE — 80053 COMPREHEN METABOLIC PANEL: CPT

## 2024-07-16 PROCEDURE — 83036 HEMOGLOBIN GLYCOSYLATED A1C: CPT

## 2024-07-16 PROCEDURE — 36415 COLL VENOUS BLD VENIPUNCTURE: CPT

## 2024-07-16 PROCEDURE — 80061 LIPID PANEL: CPT

## 2024-07-16 PROCEDURE — 84443 ASSAY THYROID STIM HORMONE: CPT

## 2024-07-16 PROCEDURE — 84153 ASSAY OF PSA TOTAL: CPT

## 2024-07-16 PROCEDURE — 85025 COMPLETE CBC W/AUTO DIFF WBC: CPT

## 2024-07-22 ENCOUNTER — EVALUATION (OUTPATIENT)
Dept: PHYSICAL THERAPY | Facility: CLINIC | Age: 59
End: 2024-07-22
Payer: COMMERCIAL

## 2024-07-22 VITALS — SYSTOLIC BLOOD PRESSURE: 168 MMHG | DIASTOLIC BLOOD PRESSURE: 118 MMHG | OXYGEN SATURATION: 93 % | HEART RATE: 97 BPM

## 2024-07-22 DIAGNOSIS — M54.16 LUMBAR RADICULITIS: Primary | ICD-10-CM

## 2024-07-22 DIAGNOSIS — M47.816 LUMBAR SPONDYLOSIS: ICD-10-CM

## 2024-07-22 DIAGNOSIS — M47.814 THORACIC SPONDYLOSIS: ICD-10-CM

## 2024-07-22 PROCEDURE — 97530 THERAPEUTIC ACTIVITIES: CPT | Mod: GP

## 2024-07-22 PROCEDURE — 97162 PT EVAL MOD COMPLEX 30 MIN: CPT | Mod: GP

## 2024-07-22 ASSESSMENT — PATIENT HEALTH QUESTIONNAIRE - PHQ9
8. MOVING OR SPEAKING SO SLOWLY THAT OTHER PEOPLE COULD HAVE NOTICED. OR THE OPPOSITE, BEING SO FIGETY OR RESTLESS THAT YOU HAVE BEEN MOVING AROUND A LOT MORE THAN USUAL: SEVERAL DAYS
2. FEELING DOWN, DEPRESSED OR HOPELESS: MORE THAN HALF THE DAYS
SUM OF ALL RESPONSES TO PHQ QUESTIONS 1-9: 14
4. FEELING TIRED OR HAVING LITTLE ENERGY: MORE THAN HALF THE DAYS
5. POOR APPETITE OR OVEREATING: MORE THAN HALF THE DAYS
SUM OF ALL RESPONSES TO PHQ9 QUESTIONS 1 AND 2: 4
3. TROUBLE FALLING OR STAYING ASLEEP OR SLEEPING TOO MUCH: MORE THAN HALF THE DAYS
10. IF YOU CHECKED OFF ANY PROBLEMS, HOW DIFFICULT HAVE THESE PROBLEMS MADE IT FOR YOU TO DO YOUR WORK, TAKE CARE OF THINGS AT HOME, OR GET ALONG WITH OTHER PEOPLE: SOMEWHAT DIFFICULT
1. LITTLE INTEREST OR PLEASURE IN DOING THINGS: MORE THAN HALF THE DAYS
9. THOUGHTS THAT YOU WOULD BE BETTER OFF DEAD, OR OF HURTING YOURSELF: NOT AT ALL
6. FEELING BAD ABOUT YOURSELF - OR THAT YOU ARE A FAILURE OR HAVE LET YOURSELF OR YOUR FAMILY DOWN: SEVERAL DAYS
7. TROUBLE CONCENTRATING ON THINGS, SUCH AS READING THE NEWSPAPER OR WATCHING TELEVISION: MORE THAN HALF THE DAYS

## 2024-07-22 ASSESSMENT — ACTIVITIES OF DAILY LIVING (ADL): ADL_ASSISTANCE: INDEPENDENT

## 2024-07-22 ASSESSMENT — ENCOUNTER SYMPTOMS
DEPRESSION: 1
OCCASIONAL FEELINGS OF UNSTEADINESS: 1
LOSS OF SENSATION IN FEET: 1

## 2024-07-22 ASSESSMENT — PAIN - FUNCTIONAL ASSESSMENT: PAIN_FUNCTIONAL_ASSESSMENT: 0-10

## 2024-07-22 ASSESSMENT — PAIN SCALES - GENERAL: PAINLEVEL_OUTOF10: 6

## 2024-07-22 ASSESSMENT — COLUMBIA-SUICIDE SEVERITY RATING SCALE - C-SSRS
2. HAVE YOU ACTUALLY HAD ANY THOUGHTS OF KILLING YOURSELF?: NO
1. IN THE PAST MONTH, HAVE YOU WISHED YOU WERE DEAD OR WISHED YOU COULD GO TO SLEEP AND NOT WAKE UP?: NO
6. HAVE YOU EVER DONE ANYTHING, STARTED TO DO ANYTHING, OR PREPARED TO DO ANYTHING TO END YOUR LIFE?: NO

## 2024-07-22 ASSESSMENT — PAIN DESCRIPTION - DESCRIPTORS: DESCRIPTORS: PRESSURE;THROBBING

## 2024-07-22 NOTE — PROGRESS NOTES
Physical Therapy Evaluation     Patient Name: Kenn Powell  MRN: [id number]  Today's Date: 7/22/2024  Time Calculation  Start Time: 0405  Stop Time: 0456  Time Calculation (min): 51 min  Billing:  Evaluation:   Evaluation: (Medium): 30   Treatment:   Therapeutic Activity:  21     Insurance  Visit #:  1    Insurance Reviewed (per information provided by  pre-cert team)  Authorization required:  No auth, 40 PT/OT     Pt will have transfer of care from [facility] to [facility] Physical Therapist. Re-Check and continuation of care will be completed by said physical therapist      Assessment:  PT Assessment  PT Assessment Results: Decreased strength, Decreased mobility, Pain, Decreased range of motion  Rehab Prognosis: Good  Evaluation/Treatment Tolerance: Patient limited by pain   59yr M pt presents to physical therapy with complaints of thoracic and lumbar pain that began around 2 months ago. During examination his blood pressure was read 2x - both times hypertensive with recommendations and strong encouragement for patient to go to the ED/Urgent Care. Pt denied wanting to seek further medical assistance because he does not want to start taking blood pressure medications. During the physical portion of the examination patient had decreased LE strength, functional mobility, tolerance with activity, gait/stair dysfunction. Patient is concerned about his co-pay each visit - it was discussed for the patient to come to 2-3 visits spread out and receive an HEP so the patient can complete exercises IND at home. Patient was educated on the [place] [facility] pool as this would be a great source if patient is able to get relief in our therapeutic pool. Pt is SAFE to participate in aquatics as can demonstrate proper use of stairs without assistance from therapist and is aware of the precautions/contraindications prior to attending aquatic therapy. pt would benefit from  skilled physical therapy to maximize  pt safety, increase tolerance to activity and to address impairments to restore PLOF with ADLs, functional mobility and participation in activities.      Plan:  OP PT Plan  Treatment/Interventions: Aquatic therapy, Education/ Instruction  PT Plan: Skilled PT  PT Frequency:  (1-3 Visits spaced out)  Onset Date: 07/01/24  Rehab Potential: Good  Plan of Care Agreement: Patient  NV: CHECK BP, start aqua program and print out HEP so he can start at [facility], make sure he has a form as well     Current Problem:   Problem List Items Addressed This Visit             ICD-10-CM    Lumbar spondylosis M47.816    Relevant Orders    Follow Up In Physical Therapy    Lumbar radiculitis - Primary M54.16    Relevant Orders    Follow Up In Physical Therapy    Thoracic spondylosis M47.814    Relevant Orders    Follow Up In Physical Therapy     Imaging: X-RAY   Lumbar Spine (7/1/24): Moderate facet disease lower lumbar spine. No acute abnormality    Thoracic Spine (7/1/24): Diffuse idiopathic skeletal hyperostosis. Kyphotic changes. No definite fracture seen. In the setting of trauma however recommend; CT for complete evaluation      Subjective    General:  General  Reason for Referral: Lumbar/Thoracic Pain  Referred By: MD Sasha  Past Medical History Relevant to Rehab: HTN, liver disease; Vertebral compression fz (1988); see med chart for more detail  Preferred Learning Style: verbal, visual, written  General Comment: Reports about 2 months ago he went to get out of the bed and from the waist down he had a heavy sensation in the low to mid back and had radiating numbness/tingling making it difficult to walk. Reports it has diminished minimally. States he can complete cat cows, trunk rotation gives some relief temporarily. 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 States he also has had problems with the numbness/sciatica. Completing grass cutting is difficult now and has 13 cubic feet of mulch and states he is going  into the house  basically in a coma. Yard work/house work is difficult. Up/Down stairs is difficult. laundry is difficult. Used to be able to complete car maintance and now cannot complete. Works in manufacturing engineering - he states he is good stand for about 10 min and then has to shift his leg. Chiropracter, TENs, and Heat really helped with his symptoms.  Precautions:  Precautions  STEADI Fall Risk Score (The score of 4 or more indicates an increased risk of falling): 7  Hearing/Visual Limitations: Glasses  Vital Signs:  Vital Signs  Heart Rate: 97  Heart Rate Source: Monitor  SpO2: 93 %  BP: (!) 168/118 (Reports that he is not on BP meds and does not want to go get further medically evaluated at urgent care)  BP Location: Right arm  BP Method: Automatic  Patient Position: Sitting  BP At end of session: 179/118mmHg    Pain:  Pain Assessment  Pain Assessment: 0-10  0-10 (Numeric) Pain Score: 6  Pain Type: Chronic pain  Pain Location: Back  Pain Radiating Towards: Radiating down both legs  Pain Descriptors: Pressure, Throbbing (Electrical)  Pain Frequency: Constant/continuous  Clinical Progression: Not changed  Effect of Pain on Daily Activities: Unable to complete daily activties at the same level that he used too; cannot work on his kids cars  Home Living:  Home Living  Home Living Comment: [place] home; laundry in basement with 10 steps with a HR; 2 steps to enter the house and 2 steps into the kitchen; walk-in shower; 1 grab bar. Has access to cane, walker , and crutches.  Prior Level of Function:  Prior Function Per Pt/Caregiver Report  Level of [place]: Independent with ADLs and functional transfers, Independent with homemaking with ambulation  Receives Help From: Other (Comment) (Kids and wife unable to assist)  ADL Assistance: Independent  Homemaking Assistance: Independent  Ambulatory Assistance: Independent  Vocational: Full time employment ()  Leisure: Enjoys fixing cars, cutting grass  Hand Dominance:  Right    Objective   Functional Assessments:  Gait: The patient is ambulating with the following device: he is not using a device.. Gait deviations include: Moderately antalgic, Decreased velocity, Decreased stride length, Wide base of support, and Forward flexed.  Stair Negotiation: Ascends and descends reciprocally with the use of BL Rail   Sit to Stand Transfers: independent  Bed Mobility: independent     Lumbar Spine  Lumbar Palpation/Joint Mobility Assessment  Palpation/Joint Mobility Comment: Pain with palpation to the lumbar vertebra and lower T/S  Lumbar AROM  Lumbar flexion: (60°): Able to touch his mid shin BL; painful in the lumbar spine  Lumbar extension (25°): 10; more painful then bending backwards  Lumbar rotation right (30°): WFL; pain to the R  Lumbar rotation left (30°): WFL  Lumbar sidebend right (25°): Max def - unable to slide past lateral upper thigh; painful  Lumbar sidebend left (25°): Max def - unable to slide past lateral upper thigh; painful  LE AROM:   Seated: WFL     Hip PROM:   WFL - pain in the L LE with ADD   Lumbar Myotomes  Lumbar Myotomes WFL: yes (BL Hip ABD: 4-5; BL Hip ADD: 4-/5)  R Hip Flex : 4/5  L Hip Flex (L2): (5/5): 4/5  R Knee Ext (L3): (5/5): 4/5  L Knee Ext (L3): (5/5) : 4/5  R Ankle DF (L4): (5/5): 4+/5  L Ankle DF (L4): (5/5): 5/5  R Great Toe Ext (L5): (5/5) : 5/5  L Great Toe Ext (L5): (5/5): 5/5  Specific Lower Extremity MMT  R Hip External Rotation: (5/5): 4/5 painful  L Hip External Rotation: (5/5): 4+/5   Dermatomes  Dermatomes WFL: yes  R Anterior / Medial thigh ( L2, L3): WFL  L Anterior / Medial thigh ( L2, L3): WFL  R Medial ankle (L4): WFL  L Medial ankle (L4): WFL  R Lateral calf/dorsal/plantar surface of foot, digit 1-4 (L5): WFL  L Lateral calf/dorsal/plantar surface of foot, digit 1-4 (L5): WFL  R Digit 5 (S1): WFL  L Digit 5 (S1): WFL  Special Tests  Supine SLR: (Negative): Negative BL  Slump: (Negative): (+) BL     Outcome Measures:  Other  Measures  Oswestry Disablity Index (GERRY): 54     Outcome Measures  Evaluation: 7/22/24   TUG   Age Group    Normal Time in Seconds  (95% Confidence Interval)   60 - 69 years:        8.1 seconds          (7.1 - 9.0)                  70 - 79 years :       9.2 seconds          (8.2 - 10.2)               80 - 99 years:       11.3 seconds        (10.0 - 12.7)             Community Dwelling Frail Older Adults  > 12-14 seconds  associated with high fall risk                 Post-op hip fracture patients at time of discharge  > 24 seconds predictive of falls within 6 months after hip fracture                   Frail older adults > 30 predictive of requiring assistive device for ambulation and being dependent in ADLs              8.91s          Treatments:  Therapeutic Activity: Moderate Complex; Changing/evolving conditions, more than >2 co morbidities   Educated the patient on the Benefits of Aquatics; precautions and contraindications   2. Functional Performance via gait analysis of TUG: Verbal cues for sequencing/positioning. 2x10' at SBA.   3. Functional mobility via AROM/PROM of LE; Verbal cues for sequencing/positioning.  4. Functional Performance via MMT of LE: Hip, knee, ankle; Verbal cues for sequencing/positioning.  5.  Functional Performance via AROM of L/S; VC for proper sequencing/positioning   6. Functional Performance via stairs: ascends/descends 4 6in steps in a reciprocal pattern with BL use of handrails   7. Educated pt on POC, goals of physical therapy, purpose of physical therapy, as well as the benefits in participating in physical therapy to increase functional mobility and maximize pt safety.      EDUCATION:  Outpatient Education  Individual(s) Educated: Patient  Education Provided: Anatomy, Body Mechanics, Fall Risk, Home Safety, POC, Physiology, Posture  Community Resources: [employer] and other Community pools in the local area  Risk and Benefits Discussed with  Patient/Caregiver/Other: yes  Patient/Caregiver Demonstrated Understanding: yes  Plan of Care Discussed and Agreed Upon: yes  Patient Response to Education: Patient/Caregiver Verbalized Understanding of Information, Patient/Caregiver Performed Return Demonstration of Exercises/Activities, Patient/Caregiver Asked Appropriate Questions    Goals:  STG: Pt will be independent in HEP & symptom management    LTGs:  Pain: Pt will demonstrate 2pt improvement on the VAS pain scale, allowing for improved tolerance of functional activities.     ROM: Pt will demonstrate no losses in all lumbar AROM, without onset of pain, allowing for the ability to perform functional activities.     Strength: pt will improve LE strength to >/= 4+/5 to improve functional strength/mobility    Function: Pt will not have exacerbations of symptoms during standing for 20min     Function: Pt will meet above goals allowing pt to return to mowing his lawn with decreased symptoms     GERRY: pt will improve Oswestry (GERRY) score by at least 12 points (minimal clinically important difference) in order to reflect improvement in ADL's/pain reduction. Baseline 27/50, (GERRY score of </=10/50 (</=20%) represents minimal to no disability)    TUG: pt will complete TUG within 12 seconds or less (indicative of higher fall risk) in order to INC balance and enhance safety during ADLs/ IADLs. (> or equal to 12 seconds indicates high risk for falls in older adults. 11-20 seconds is normal for the frail and elderly.) OR MCID 3.4s Baseline 8.91 sec    Stairs: pt will ascend/descend step over step (6in step) with proper gait mechanics and use of <1HR to promote functional mobility and improve functional performance.

## 2024-07-25 ENCOUNTER — OFFICE VISIT (OUTPATIENT)
Dept: PRIMARY CARE | Facility: CLINIC | Age: 59
End: 2024-07-25
Payer: COMMERCIAL

## 2024-07-25 VITALS
BODY MASS INDEX: 37 KG/M2 | WEIGHT: 304 LBS | OXYGEN SATURATION: 99 % | HEART RATE: 99 BPM | SYSTOLIC BLOOD PRESSURE: 170 MMHG | RESPIRATION RATE: 18 BRPM | TEMPERATURE: 97.3 F | DIASTOLIC BLOOD PRESSURE: 98 MMHG

## 2024-07-25 DIAGNOSIS — E88.810 METABOLIC SYNDROME: ICD-10-CM

## 2024-07-25 DIAGNOSIS — I10 ACCELERATED HYPERTENSION: Primary | ICD-10-CM

## 2024-07-25 PROCEDURE — 99213 OFFICE O/P EST LOW 20 MIN: CPT | Performed by: FAMILY MEDICINE

## 2024-07-25 PROCEDURE — 1036F TOBACCO NON-USER: CPT | Performed by: FAMILY MEDICINE

## 2024-07-25 PROCEDURE — 3077F SYST BP >= 140 MM HG: CPT | Performed by: FAMILY MEDICINE

## 2024-07-25 PROCEDURE — 3080F DIAST BP >= 90 MM HG: CPT | Performed by: FAMILY MEDICINE

## 2024-07-25 RX ORDER — LOSARTAN POTASSIUM AND HYDROCHLOROTHIAZIDE 12.5; 5 MG/1; MG/1
1 TABLET ORAL DAILY
Qty: 90 TABLET | Refills: 0 | Status: SHIPPED | OUTPATIENT
Start: 2024-07-25 | End: 2024-10-23

## 2024-07-25 RX ORDER — BISMUTH SUBSALICYLATE 262 MG
1 TABLET,CHEWABLE ORAL DAILY
COMMUNITY

## 2024-07-25 ASSESSMENT — PATIENT HEALTH QUESTIONNAIRE - PHQ9
2. FEELING DOWN, DEPRESSED OR HOPELESS: NOT AT ALL
SUM OF ALL RESPONSES TO PHQ9 QUESTIONS 1 AND 2: 0
1. LITTLE INTEREST OR PLEASURE IN DOING THINGS: NOT AT ALL

## 2024-07-25 ASSESSMENT — ENCOUNTER SYMPTOMS: HYPERTENSION: 1

## 2024-07-25 ASSESSMENT — PAIN SCALES - GENERAL: PAINLEVEL: 6

## 2024-07-25 NOTE — PROGRESS NOTES
Subjective   Patient ID: Kenn Powell is a 59 y.o. male who presents for Follow-up and Hypertension.    Hypertension         BP ranges from 190-140/110-90  No symptoms   Previpous years it was never > 140/80  Never been on BP medications.  Review of Systems   All other systems reviewed and are negative.      Objective   BP (!) 170/98 (BP Location: Left arm, Patient Position: Sitting, BP Cuff Size: Large adult)   Pulse 99   Temp 36.3 °C (97.3 °F) (Temporal)   Resp 18   Wt 138 kg (304 lb)   SpO2 99%   BMI 37.00 kg/m²     Physical Exam  Vitals and nursing note reviewed.   Cardiovascular:      Rate and Rhythm: Normal rate and regular rhythm.   Pulmonary:      Effort: Pulmonary effort is normal.      Breath sounds: Normal breath sounds.   Musculoskeletal:      Cervical back: Neck supple.   Lymphadenopathy:      Cervical: No cervical adenopathy.   Neurological:      Mental Status: He is alert.         Assessment/Plan   Diagnoses and all orders for this visit:  Accelerated hypertension  -     losartan-hydrochlorothiazide (Hyzaar) 50-12.5 mg tablet; Take 1 tablet by mouth once daily.  Metabolic syndrome  Comments:  discussed diet and LSC . add GLP 1 at next visit.  Other orders  -     Follow Up In Primary Care - Established; Future

## 2024-07-29 ENCOUNTER — APPOINTMENT (OUTPATIENT)
Dept: PRIMARY CARE | Facility: CLINIC | Age: 59
End: 2024-07-29
Payer: COMMERCIAL

## 2024-08-11 ASSESSMENT — ENCOUNTER SYMPTOMS
HEADACHES: 1
BLURRED VISION: 0
PND: 0
NECK PAIN: 1
ORTHOPNEA: 0
SHORTNESS OF BREATH: 0
HYPERTENSION: 1
SWEATS: 0
PALPITATIONS: 0

## 2024-08-12 ENCOUNTER — APPOINTMENT (OUTPATIENT)
Dept: PRIMARY CARE | Facility: CLINIC | Age: 59
End: 2024-08-12
Payer: COMMERCIAL

## 2024-08-12 ENCOUNTER — TELEPHONE (OUTPATIENT)
Dept: PRIMARY CARE | Facility: CLINIC | Age: 59
End: 2024-08-12

## 2024-08-12 NOTE — TELEPHONE ENCOUNTER
Patient having GI issues and was unable to connect for today's virtual visit(8/12/24) due to  problems. Patient has been on liquid diet and is going to reach out to  via my-chart also was given option to go to Urgent care or E.R. for treatment.

## 2024-08-19 ENCOUNTER — APPOINTMENT (OUTPATIENT)
Dept: ORTHOPEDIC SURGERY | Facility: CLINIC | Age: 59
End: 2024-08-19
Payer: COMMERCIAL

## 2024-08-23 ENCOUNTER — APPOINTMENT (OUTPATIENT)
Dept: PRIMARY CARE | Facility: CLINIC | Age: 59
End: 2024-08-23
Payer: COMMERCIAL

## 2024-08-26 ENCOUNTER — APPOINTMENT (OUTPATIENT)
Dept: PRIMARY CARE | Facility: CLINIC | Age: 59
End: 2024-08-26
Payer: COMMERCIAL

## 2024-08-26 VITALS
TEMPERATURE: 97.3 F | BODY MASS INDEX: 35.97 KG/M2 | RESPIRATION RATE: 18 BRPM | OXYGEN SATURATION: 94 % | WEIGHT: 295.5 LBS | DIASTOLIC BLOOD PRESSURE: 83 MMHG | SYSTOLIC BLOOD PRESSURE: 116 MMHG | HEART RATE: 102 BPM

## 2024-08-26 DIAGNOSIS — I10 HTN (HYPERTENSION), BENIGN: ICD-10-CM

## 2024-08-26 PROCEDURE — 3079F DIAST BP 80-89 MM HG: CPT | Performed by: FAMILY MEDICINE

## 2024-08-26 PROCEDURE — 99213 OFFICE O/P EST LOW 20 MIN: CPT | Performed by: FAMILY MEDICINE

## 2024-08-26 PROCEDURE — 3074F SYST BP LT 130 MM HG: CPT | Performed by: FAMILY MEDICINE

## 2024-08-26 ASSESSMENT — PATIENT HEALTH QUESTIONNAIRE - PHQ9
1. LITTLE INTEREST OR PLEASURE IN DOING THINGS: NOT AT ALL
SUM OF ALL RESPONSES TO PHQ9 QUESTIONS 1 AND 2: 0
2. FEELING DOWN, DEPRESSED OR HOPELESS: NOT AT ALL

## 2024-08-26 NOTE — PROGRESS NOTES
Subjective   Patient ID: Kenn Powell is a 59 y.o. male who presents for Dizziness (Per Patient heart beating out of whack).    HPI     Highest =185/89 ( 3 weeks ago)  Lowest =8/16 107/63    8/2534=798/92 ( 114/76)  8/2470=551/82 ( 114/71)  8/2279=143/75    EKG done 6 weeks ago at [facility]    non exertional, irregular heart rate last few seconds, with lightheadedness.    Review of Systems   All other systems reviewed and are negative.      Objective   /83 (BP Location: Right arm, Patient Position: Sitting, BP Cuff Size: Large adult)   Pulse 102   Temp 36.3 °C (97.3 °F) (Temporal)   Resp 18   Wt 134 kg (295 lb 8 oz)   SpO2 94%   BMI 35.97 kg/m²     Physical Exam  Vitals and nursing note reviewed.   Cardiovascular:      Rate and Rhythm: Normal rate and regular rhythm.   Pulmonary:      Effort: Pulmonary effort is normal.      Breath sounds: Normal breath sounds.   Musculoskeletal:      Cervical back: Neck supple.   Lymphadenopathy:      Cervical: No cervical adenopathy.   Neurological:      Mental Status: He is alert.         Assessment/Plan   Diagnoses and all orders for this visit:  HTN (hypertension), benign  Comments:  recommended to reduced Hyzaar to half dose ( cut 50-12,5 ) to take 25+ 6.25. BP gaol 130/80 not achieved, add coreg.  Orders:  -     Follow Up In Primary Care - Established  Other orders  -     Follow Up In Primary Care - Established  -     Follow Up In Primary Care - Established; Future

## 2024-08-28 DIAGNOSIS — I10 ACCELERATED HYPERTENSION: ICD-10-CM

## 2024-08-29 RX ORDER — LOSARTAN POTASSIUM AND HYDROCHLOROTHIAZIDE 12.5; 5 MG/1; MG/1
1 TABLET ORAL DAILY
Qty: 90 TABLET | Refills: 3 | Status: SHIPPED | OUTPATIENT
Start: 2024-08-29 | End: 2025-08-29

## 2024-09-03 DIAGNOSIS — I10 HTN (HYPERTENSION), BENIGN: Primary | ICD-10-CM

## 2024-09-03 RX ORDER — LOSARTAN POTASSIUM 50 MG/1
50 TABLET ORAL DAILY
Qty: 30 TABLET | Refills: 2 | Status: SHIPPED | OUTPATIENT
Start: 2024-09-03 | End: 2024-12-02

## 2024-09-17 DIAGNOSIS — I10 HTN (HYPERTENSION), BENIGN: ICD-10-CM

## 2024-09-17 RX ORDER — LOSARTAN POTASSIUM 50 MG/1
50 TABLET ORAL DAILY
Qty: 90 TABLET | Refills: 1 | Status: SHIPPED | OUTPATIENT
Start: 2024-09-17

## 2024-11-25 ENCOUNTER — APPOINTMENT (OUTPATIENT)
Dept: PRIMARY CARE | Facility: CLINIC | Age: 59
End: 2024-11-25
Payer: COMMERCIAL

## 2024-11-25 VITALS
RESPIRATION RATE: 18 BRPM | TEMPERATURE: 96.8 F | HEART RATE: 98 BPM | WEIGHT: 293.5 LBS | SYSTOLIC BLOOD PRESSURE: 128 MMHG | BODY MASS INDEX: 35.73 KG/M2 | OXYGEN SATURATION: 93 % | DIASTOLIC BLOOD PRESSURE: 91 MMHG

## 2024-11-25 DIAGNOSIS — E88.810 METABOLIC SYNDROME: ICD-10-CM

## 2024-11-25 DIAGNOSIS — I10 HTN (HYPERTENSION), BENIGN: ICD-10-CM

## 2024-11-25 DIAGNOSIS — R53.82 CHRONIC FATIGUE: Primary | ICD-10-CM

## 2024-11-25 PROCEDURE — 1036F TOBACCO NON-USER: CPT | Performed by: FAMILY MEDICINE

## 2024-11-25 PROCEDURE — 99214 OFFICE O/P EST MOD 30 MIN: CPT | Performed by: FAMILY MEDICINE

## 2024-11-25 PROCEDURE — 3074F SYST BP LT 130 MM HG: CPT | Performed by: FAMILY MEDICINE

## 2024-11-25 PROCEDURE — 3080F DIAST BP >= 90 MM HG: CPT | Performed by: FAMILY MEDICINE

## 2024-11-25 ASSESSMENT — PATIENT HEALTH QUESTIONNAIRE - PHQ9
SUM OF ALL RESPONSES TO PHQ9 QUESTIONS 1 AND 2: 1
10. IF YOU CHECKED OFF ANY PROBLEMS, HOW DIFFICULT HAVE THESE PROBLEMS MADE IT FOR YOU TO DO YOUR WORK, TAKE CARE OF THINGS AT HOME, OR GET ALONG WITH OTHER PEOPLE: SOMEWHAT DIFFICULT
1. LITTLE INTEREST OR PLEASURE IN DOING THINGS: SEVERAL DAYS
2. FEELING DOWN, DEPRESSED OR HOPELESS: NOT AT ALL

## 2024-11-25 ASSESSMENT — ENCOUNTER SYMPTOMS: FATIGUE: 1

## 2024-11-25 NOTE — PROGRESS NOTES
Subjective   Patient ID: Kenn Powell is a 59 y.o. male who presents for Follow-up, Bloated (x2 months), and Fatigue.    Fatigue  Associated symptoms include fatigue.      CCM    Easy fatigability- tires with usual and daily household activities   Home BP in  normal [id number].  GI symptoms: bloating,flatulence  Colonoscopy : 5 + years ago with diverticulosis   Review of Systems   Constitutional:  Positive for fatigue.       Objective   BP (!) 128/91 (BP Location: Left arm, Patient Position: Sitting, BP Cuff Size: Large adult)   Pulse 98   Temp 36 °C (96.8 °F) (Temporal)   Resp 18   Wt 133 kg (293 lb 8 oz)   SpO2 93%   BMI 35.73 kg/m²     Physical Exam  Vitals and nursing note reviewed.   Cardiovascular:      Rate and Rhythm: Normal rate and regular rhythm.   Pulmonary:      Effort: Pulmonary effort is normal.      Breath sounds: Normal breath sounds.   Abdominal:      Tenderness: There is no abdominal tenderness.   Musculoskeletal:      Cervical back: Neck supple.   Lymphadenopathy:      Cervical: No cervical adenopathy.   Neurological:      Mental Status: He is alert.         Assessment/Plan   Diagnoses and all orders for this visit:  Chronic fatigue  Comments:  Hepatic steastosis  Orders:  -     Comprehensive Metabolic Panel; Future  -     CBC and Auto Differential; Future  -     Tsh With Reflex To Free T4 If Abnormal; Future  Metabolic syndrome  Comments:  discussed low fat, low carb diet with small portion meals.  Orders:  -     Lipid panel; Future  -     CT cardiac scoring wo IV contrast; Future  HTN (hypertension), benign  -     Comprehensive Metabolic Panel; Future  -     CT cardiac scoring wo IV contrast; Future  Other orders  -     Follow Up In Primary Care - Established  -     Follow Up In Primary Care - Established; Future

## 2024-11-26 ENCOUNTER — LAB (OUTPATIENT)
Dept: LAB | Facility: LAB | Age: 59
End: 2024-11-26
Payer: COMMERCIAL

## 2024-11-26 DIAGNOSIS — R53.82 CHRONIC FATIGUE: ICD-10-CM

## 2024-11-26 DIAGNOSIS — E88.810 METABOLIC SYNDROME: ICD-10-CM

## 2024-11-26 DIAGNOSIS — I10 HTN (HYPERTENSION), BENIGN: ICD-10-CM

## 2024-11-26 LAB
ALBUMIN SERPL BCP-MCNC: 4.7 G/DL (ref 3.4–5)
ALP SERPL-CCNC: 95 U/L (ref 33–120)
ALT SERPL W P-5'-P-CCNC: 65 U/L (ref 10–52)
ANION GAP SERPL CALC-SCNC: 12 MMOL/L (ref 10–20)
AST SERPL W P-5'-P-CCNC: 31 U/L (ref 9–39)
BASOPHILS # BLD AUTO: 0.06 X10*3/UL (ref 0–0.1)
BASOPHILS NFR BLD AUTO: 0.9 %
BILIRUB SERPL-MCNC: 0.8 MG/DL (ref 0–1.2)
BUN SERPL-MCNC: 15 MG/DL (ref 6–23)
CALCIUM SERPL-MCNC: 9.6 MG/DL (ref 8.6–10.3)
CHLORIDE SERPL-SCNC: 103 MMOL/L (ref 98–107)
CHOLEST SERPL-MCNC: 212 MG/DL (ref 0–199)
CHOLESTEROL/HDL RATIO: 4.2
CO2 SERPL-SCNC: 29 MMOL/L (ref 21–32)
CREAT SERPL-MCNC: 1.02 MG/DL (ref 0.5–1.3)
EGFRCR SERPLBLD CKD-EPI 2021: 86 ML/MIN/1.73M*2
EOSINOPHIL # BLD AUTO: 0.17 X10*3/UL (ref 0–0.7)
EOSINOPHIL NFR BLD AUTO: 2.5 %
ERYTHROCYTE [DISTWIDTH] IN BLOOD BY AUTOMATED COUNT: 12.7 % (ref 11.5–14.5)
GLUCOSE SERPL-MCNC: 102 MG/DL (ref 74–99)
HCT VFR BLD AUTO: 51.3 % (ref 41–52)
HDLC SERPL-MCNC: 50.4 MG/DL
HGB BLD-MCNC: 17.1 G/DL (ref 13.5–17.5)
IMM GRANULOCYTES # BLD AUTO: 0.04 X10*3/UL (ref 0–0.7)
IMM GRANULOCYTES NFR BLD AUTO: 0.6 % (ref 0–0.9)
LDLC SERPL CALC-MCNC: 139 MG/DL
LYMPHOCYTES # BLD AUTO: 2.23 X10*3/UL (ref 1.2–4.8)
LYMPHOCYTES NFR BLD AUTO: 33.4 %
MCH RBC QN AUTO: 30.1 PG (ref 26–34)
MCHC RBC AUTO-ENTMCNC: 33.3 G/DL (ref 32–36)
MCV RBC AUTO: 90 FL (ref 80–100)
MONOCYTES # BLD AUTO: 0.59 X10*3/UL (ref 0.1–1)
MONOCYTES NFR BLD AUTO: 8.8 %
NEUTROPHILS # BLD AUTO: 3.59 X10*3/UL (ref 1.2–7.7)
NEUTROPHILS NFR BLD AUTO: 53.8 %
NON HDL CHOLESTEROL: 162 MG/DL (ref 0–149)
NRBC BLD-RTO: 0 /100 WBCS (ref 0–0)
PLATELET # BLD AUTO: 191 X10*3/UL (ref 150–450)
POTASSIUM SERPL-SCNC: 4.4 MMOL/L (ref 3.5–5.3)
PROT SERPL-MCNC: 7.5 G/DL (ref 6.4–8.2)
RBC # BLD AUTO: 5.69 X10*6/UL (ref 4.5–5.9)
SODIUM SERPL-SCNC: 140 MMOL/L (ref 136–145)
TRIGL SERPL-MCNC: 113 MG/DL (ref 0–149)
TSH SERPL-ACNC: 1.35 MIU/L (ref 0.44–3.98)
VLDL: 23 MG/DL (ref 0–40)
WBC # BLD AUTO: 6.7 X10*3/UL (ref 4.4–11.3)

## 2024-11-26 PROCEDURE — 80053 COMPREHEN METABOLIC PANEL: CPT

## 2024-11-26 PROCEDURE — 84443 ASSAY THYROID STIM HORMONE: CPT

## 2024-11-26 PROCEDURE — 80061 LIPID PANEL: CPT

## 2024-11-26 PROCEDURE — 36415 COLL VENOUS BLD VENIPUNCTURE: CPT

## 2024-11-26 PROCEDURE — 85025 COMPLETE CBC W/AUTO DIFF WBC: CPT

## 2025-01-15 ENCOUNTER — HOSPITAL ENCOUNTER (OUTPATIENT)
Dept: RADIOLOGY | Facility: CLINIC | Age: 60
Discharge: HOME | End: 2025-01-15
Payer: COMMERCIAL

## 2025-01-15 DIAGNOSIS — E88.810 METABOLIC SYNDROME: ICD-10-CM

## 2025-01-15 DIAGNOSIS — I10 HTN (HYPERTENSION), BENIGN: ICD-10-CM

## 2025-01-15 PROCEDURE — 75571 CT HRT W/O DYE W/CA TEST: CPT

## 2025-01-22 ENCOUNTER — APPOINTMENT (OUTPATIENT)
Dept: PRIMARY CARE | Facility: CLINIC | Age: 60
End: 2025-01-22
Payer: COMMERCIAL

## 2025-01-22 VITALS
TEMPERATURE: 97.2 F | RESPIRATION RATE: 18 BRPM | OXYGEN SATURATION: 93 % | DIASTOLIC BLOOD PRESSURE: 88 MMHG | BODY MASS INDEX: 36.31 KG/M2 | SYSTOLIC BLOOD PRESSURE: 134 MMHG | WEIGHT: 298.3 LBS | HEART RATE: 91 BPM

## 2025-01-22 DIAGNOSIS — I10 HYPERTENSION, BENIGN: Primary | ICD-10-CM

## 2025-01-22 DIAGNOSIS — R39.12 BENIGN PROSTATIC HYPERPLASIA WITH WEAK URINARY STREAM: ICD-10-CM

## 2025-01-22 DIAGNOSIS — N40.1 BENIGN PROSTATIC HYPERPLASIA WITH WEAK URINARY STREAM: ICD-10-CM

## 2025-01-22 PROBLEM — K55.9 ISCHEMIC COLITIS (MULTI): Status: RESOLVED | Noted: 2024-01-17 | Resolved: 2025-01-22

## 2025-01-22 PROBLEM — J30.9 ALLERGIC RHINITIS: Status: ACTIVE | Noted: 2025-01-22

## 2025-01-22 PROBLEM — K22.10 EROSIVE ESOPHAGITIS: Status: RESOLVED | Noted: 2024-01-17 | Resolved: 2025-01-22

## 2025-01-22 PROBLEM — R10.12 LEFT UPPER QUADRANT PAIN: Status: RESOLVED | Noted: 2024-01-17 | Resolved: 2025-01-22

## 2025-01-22 PROBLEM — K57.30 DIVERTICULAR DISEASE OF COLON: Status: ACTIVE | Noted: 2020-08-04

## 2025-01-22 PROBLEM — Z87.442 H/O RENAL CALCULI: Status: ACTIVE | Noted: 2025-01-22

## 2025-01-22 PROBLEM — K42.0 UMBILICAL HERNIA WITH OBSTRUCTION: Status: RESOLVED | Noted: 2024-01-17 | Resolved: 2025-01-22

## 2025-01-22 PROBLEM — K76.9 CHRONIC NONALCOHOLIC LIVER DISEASE: Status: ACTIVE | Noted: 2020-08-11

## 2025-01-22 PROBLEM — K57.92 ACUTE DIVERTICULITIS: Status: RESOLVED | Noted: 2024-01-17 | Resolved: 2025-01-22

## 2025-01-22 PROBLEM — K21.9 GASTROESOPHAGEAL REFLUX DISEASE: Status: ACTIVE | Noted: 2025-01-22

## 2025-01-22 PROCEDURE — 3075F SYST BP GE 130 - 139MM HG: CPT | Performed by: FAMILY MEDICINE

## 2025-01-22 PROCEDURE — 3079F DIAST BP 80-89 MM HG: CPT | Performed by: FAMILY MEDICINE

## 2025-01-22 PROCEDURE — 99214 OFFICE O/P EST MOD 30 MIN: CPT | Performed by: FAMILY MEDICINE

## 2025-01-22 RX ORDER — TAMSULOSIN HYDROCHLORIDE 0.4 MG/1
0.4 CAPSULE ORAL DAILY
Qty: 90 CAPSULE | Refills: 0 | Status: SHIPPED | OUTPATIENT
Start: 2025-01-22 | End: 2025-04-22

## 2025-01-22 NOTE — PROGRESS NOTES
Subjective   Patient ID: Kenn Powell is a 59 y.o. male who presents for Follow-up (1 Month), Bloated, and Fatigue (Head feels like it is spinning).    HPI     BP follow up.    Weak urinary stream.  Fatigue.  Discuss labs and Cardiac calcium score.    Review of Systems   All other systems reviewed and are negative.      Objective   /88 (BP Location: Right arm, Patient Position: Sitting, BP Cuff Size: Large adult)   Pulse 91   Temp 36.2 °C (97.2 °F) (Temporal)   Resp 18   Wt 135 kg (298 lb 4.8 oz)   SpO2 93%   BMI 36.31 kg/m²     Physical Exam  Vitals and nursing note reviewed.   Cardiovascular:      Rate and Rhythm: Normal rate and regular rhythm.   Pulmonary:      Effort: Pulmonary effort is normal.      Breath sounds: Normal breath sounds.   Musculoskeletal:      Cervical back: Neck supple.   Lymphadenopathy:      Cervical: No cervical adenopathy.   Neurological:      Mental Status: He is alert.   Psychiatric:         Mood and Affect: Mood normal.         Behavior: Behavior normal.         Thought Content: Thought content normal.         Judgment: Judgment normal.         Assessment/Plan   Diagnoses and all orders for this visit:  Hypertension, benign  Comments:  at goal on Losartan.  Benign prostatic hyperplasia with weak urinary stream  -     tamsulosin (Flomax) 0.4 mg 24 hr capsule; Take 1 capsule (0.4 mg) by mouth once daily.  Other orders  -     Follow Up In Primary Care - Established  -     Follow Up In Primary Care - Established; Future    Coronary artery calcium score of 47.5:

## 2025-02-15 ENCOUNTER — OFFICE VISIT (OUTPATIENT)
Dept: URGENT CARE | Age: 60
End: 2025-02-15
Payer: COMMERCIAL

## 2025-02-15 VITALS
OXYGEN SATURATION: 97 % | DIASTOLIC BLOOD PRESSURE: 87 MMHG | TEMPERATURE: 98.6 F | RESPIRATION RATE: 24 BRPM | HEART RATE: 100 BPM | SYSTOLIC BLOOD PRESSURE: 128 MMHG

## 2025-02-15 DIAGNOSIS — J11.1 INFLUENZA: ICD-10-CM

## 2025-02-15 DIAGNOSIS — R50.9 FEVER IN ADULT: Primary | ICD-10-CM

## 2025-02-15 LAB
POC RAPID INFLUENZA A: POSITIVE
POC RAPID INFLUENZA B: NEGATIVE
POC SARS-COV-2 AG BINAX: NORMAL

## 2025-02-15 RX ORDER — ALBUTEROL SULFATE 90 UG/1
2 INHALANT RESPIRATORY (INHALATION) EVERY 6 HOURS PRN
Qty: 18 G | Refills: 0 | Status: SHIPPED | OUTPATIENT
Start: 2025-02-15 | End: 2026-02-15

## 2025-02-15 RX ORDER — PREDNISONE 20 MG/1
40 TABLET ORAL DAILY
Qty: 10 TABLET | Refills: 0 | Status: SHIPPED | OUTPATIENT
Start: 2025-02-15 | End: 2025-02-20

## 2025-02-15 RX ORDER — IPRATROPIUM BROMIDE AND ALBUTEROL SULFATE 2.5; .5 MG/3ML; MG/3ML
3 SOLUTION RESPIRATORY (INHALATION) ONCE
Status: COMPLETED | OUTPATIENT
Start: 2025-02-15 | End: 2025-02-15

## 2025-02-15 RX ADMIN — IPRATROPIUM BROMIDE AND ALBUTEROL SULFATE 3 ML: 2.5; .5 SOLUTION RESPIRATORY (INHALATION) at 09:13

## 2025-02-15 ASSESSMENT — PAIN SCALES - GENERAL: PAINLEVEL_OUTOF10: 8

## 2025-02-15 ASSESSMENT — ENCOUNTER SYMPTOMS
HEADACHES: 1
WHEEZING: 1
FEVER: 1
COUGH: 1
FATIGUE: 1

## 2025-02-15 NOTE — PROGRESS NOTES
Subjective   Patient ID: Kenn Powell is a 59 y.o. male. They present today with a chief complaint of Fever ( Per Pt hx body aches and feeling shortness of breath and also has been coughing.).    History of Present Illness  Pt presents with fever and increased shortness of breath and cough for 4-5 days he has called off work for the past three days and is just not getting better  He is flu positive      Fever   Associated symptoms include coughing, headaches and wheezing.       Past Medical History  Allergies as of 02/15/2025 - Reviewed 02/15/2025   Allergen Reaction Noted    Amoxicillin Anaphylaxis 08/14/2000    Penicillin Anaphylaxis 01/17/2024       (Not in a hospital admission)       Past Medical History:   Diagnosis Date    Other specified health status     No pertinent past medical history       Past Surgical History:   Procedure Laterality Date    OTHER SURGICAL HISTORY  06/22/2022    Umbilical hernia repair    OTHER SURGICAL HISTORY  06/22/2022    Nasal septoplasty    OTHER SURGICAL HISTORY  06/22/2022    Lithotripsy    OTHER SURGICAL HISTORY  06/22/2022    Colonoscopy        reports that he has quit smoking. His smoking use included cigarettes. He has a 10 pack-year smoking history. He has never used smokeless tobacco. He reports current alcohol use. Drug use questions deferred to the physician.    Review of Systems  Review of Systems   Constitutional:  Positive for fatigue and fever.   Respiratory:  Positive for cough and wheezing.    Neurological:  Positive for headaches.                                  Objective    Vitals:    02/15/25 0847   BP: 128/87   BP Location: Left arm   Patient Position: Sitting   BP Cuff Size: Large adult   Pulse: 100   Resp: 24   Temp: 37 °C (98.6 °F)   TempSrc: Oral   SpO2: 97%     No LMP for male patient.    Physical Exam  Constitutional:       Appearance: Normal appearance.   HENT:      Head: Normocephalic and atraumatic.      Right Ear: Tympanic membrane normal.       Left Ear: Tympanic membrane normal.   Eyes:      Pupils: Pupils are equal, round, and reactive to light.   Cardiovascular:      Rate and Rhythm: Normal rate and regular rhythm.   Pulmonary:      Breath sounds: Wheezing present.   Neurological:      Mental Status: He is alert.         Procedures    Point of Care Test & Imaging Results from this visit  Results for orders placed or performed in visit on 02/15/25   POCT Covid-19 Rapid Antigen   Result Value Ref Range    POC KATERYNA-COV-2 AG  Presumptive negative test for SARS-CoV-2 (no antigen detected)     Presumptive negative test for SARS-CoV-2 (no antigen detected)   POCT Influenza A/B manually resulted   Result Value Ref Range    POC Rapid Influenza A Positive (A) Negative    POC Rapid Influenza B Negative Negative      No results found.    Diagnostic study results (if any) were reviewed by Norma Rogel MD.    Assessment/Plan   Allergies, medications, history, and pertinent labs/EKGs/Imaging reviewed by Norma Rogel MD.     Medical Decision Making  Pt given duoneb with someimporvement of wheezes and too late for tamiflu will treat with prednisone and albuterol  Pt exercised in room with no decrease of pulse ox    Orders and Diagnoses  Diagnoses and all orders for this visit:  Fever in adult  -     POCT Covid-19 Rapid Antigen  -     POCT Influenza A/B manually resulted  Influenza  -     ipratropium-albuteroL (Duo-Neb) 0.5-2.5 mg/3 mL nebulizer solution 3 mL  -     predniSONE (Deltasone) 20 mg tablet; Take 2 tablets (40 mg) by mouth once daily for 5 days.  -     albuterol 90 mcg/actuation inhaler; Inhale 2 puffs every 6 hours if needed for wheezing.      Medical Admin Record  Administrations This Visit       ipratropium-albuteroL (Duo-Neb) 0.5-2.5 mg/3 mL nebulizer solution 3 mL       Admin Date  02/15/2025 Action  Given Dose  3 mL Route  nebulization Documented By  Christine Chun MA                    Patient disposition: Home    Electronically signed by Norma  CLAIRE Rogel MD  9:36 AM

## 2025-02-19 ENCOUNTER — OFFICE VISIT (OUTPATIENT)
Dept: URGENT CARE | Age: 60
End: 2025-02-19
Payer: COMMERCIAL

## 2025-02-19 VITALS
WEIGHT: 300 LBS | DIASTOLIC BLOOD PRESSURE: 91 MMHG | HEART RATE: 86 BPM | OXYGEN SATURATION: 98 % | BODY MASS INDEX: 36.53 KG/M2 | TEMPERATURE: 97.9 F | SYSTOLIC BLOOD PRESSURE: 142 MMHG | RESPIRATION RATE: 18 BRPM | HEIGHT: 76 IN

## 2025-02-19 DIAGNOSIS — Z87.09 HISTORY OF INFLUENZA: ICD-10-CM

## 2025-02-19 DIAGNOSIS — R05.3 PERSISTENT COUGH: Primary | ICD-10-CM

## 2025-02-19 RX ORDER — AZITHROMYCIN 250 MG/1
TABLET, FILM COATED ORAL
Qty: 6 TABLET | Refills: 0 | Status: SHIPPED | OUTPATIENT
Start: 2025-02-19

## 2025-02-19 ASSESSMENT — ENCOUNTER SYMPTOMS
HEMATOLOGIC/LYMPHATIC NEGATIVE: 1
ALLERGIC/IMMUNOLOGIC NEGATIVE: 1
COUGH: 1
CARDIOVASCULAR NEGATIVE: 1
SHORTNESS OF BREATH: 1
GASTROINTESTINAL NEGATIVE: 1
PSYCHIATRIC NEGATIVE: 1
EYES NEGATIVE: 1
NEUROLOGICAL NEGATIVE: 1
FEVER: 0
ENDOCRINE NEGATIVE: 1
SORE THROAT: 0
MUSCULOSKELETAL NEGATIVE: 1

## 2025-02-19 NOTE — PATIENT INSTRUCTIONS
OTC cough med as directed  Continue imhaler an finish steroids as directed  Pcp follow up this week if not improving or worsening  ER visit anytime 24/7 for acute worsening or changing condition

## 2025-02-19 NOTE — PROGRESS NOTES
Subjective   Patient ID: Kenn Powell is a 59 y.o. male. They present today with a chief complaint of Other (Seen Saturday for Flu, given prednisone and albuterol, not improving).    History of Present Illness    History provided by:  Patient   used: No      This is a 59 yr old male here for persistant respiratory sxs. Dx'd with flu A on 2-. Using inhaler and steroid rx and not improving. Cough productive of clear/yellow sputum and has  dyspnea. No sore throat, ear pain or URI sxs. Sxs x 1.5 weeks.   Past Medical History  Allergies as of 02/19/2025 - Reviewed 02/19/2025   Allergen Reaction Noted    Amoxicillin Anaphylaxis 08/14/2000    Penicillin Anaphylaxis 01/17/2024       (Not in a hospital admission)       Past Medical History:   Diagnosis Date    Other specified health status     No pertinent past medical history       Past Surgical History:   Procedure Laterality Date    OTHER SURGICAL HISTORY  06/22/2022    Umbilical hernia repair    OTHER SURGICAL HISTORY  06/22/2022    Nasal septoplasty    OTHER SURGICAL HISTORY  06/22/2022    Lithotripsy    OTHER SURGICAL HISTORY  06/22/2022    Colonoscopy        reports that he has quit smoking. His smoking use included cigarettes. He has a 10 pack-year smoking history. He has never used smokeless tobacco. He reports current alcohol use. Drug use questions deferred to the physician.    Review of Systems  Review of Systems   Constitutional:  Negative for fever.   HENT:  Negative for congestion, ear pain and sore throat.    Eyes: Negative.    Respiratory:  Positive for cough and shortness of breath.    Cardiovascular: Negative.    Gastrointestinal: Negative.    Endocrine: Negative.    Genitourinary: Negative.    Musculoskeletal: Negative.    Skin: Negative.    Allergic/Immunologic: Negative.    Neurological: Negative.    Hematological: Negative.    Psychiatric/Behavioral: Negative.     All other systems reviewed and are negative.      Objective   "  Vitals:    02/19/25 1547   BP: (!) 142/91   BP Location: Left arm   Pulse: 86   Resp: 18   Temp: 36.6 °C (97.9 °F)   SpO2: 98%   Weight: 136 kg (300 lb)   Height: 1.93 m (6' 4\")     No LMP for male patient.    Physical Exam  Vitals and nursing note reviewed.   Constitutional:       Appearance: Normal appearance.   HENT:      Head: Normocephalic and atraumatic.      Right Ear: Tympanic membrane and ear canal normal.      Left Ear: Tympanic membrane and ear canal normal.      Mouth/Throat:      Mouth: Mucous membranes are moist.      Pharynx: Oropharynx is clear.   Cardiovascular:      Rate and Rhythm: Normal rate and regular rhythm.   Pulmonary:      Effort: Pulmonary effort is normal.      Breath sounds: Normal breath sounds.   Musculoskeletal:      Cervical back: Neck supple.   Lymphadenopathy:      Cervical: No cervical adenopathy.   Skin:     General: Skin is warm and dry.   Neurological:      General: No focal deficit present.      Mental Status: He is alert and oriented to person, place, and time.       Procedures    Point of Care Test & Imaging Results from this visit  No results found for this visit on 02/19/25.   No results found.    Diagnostic study results (if any) were reviewed by Radha Sprague PA-C.    Assessment/Plan   Allergies, medications, history, and pertinent labs/EKGs/Imaging reviewed by Radha Sprague PA-C.       Orders and Diagnoses  Diagnoses and all orders for this visit:  Persistent cough  -     azithromycin (Zithromax) 250 mg tablet; Take 2 tabs (500 mg) by mouth today, then take 1 tab daily for 4 days.  History of influenza  -     azithromycin (Zithromax) 250 mg tablet; Take 2 tabs (500 mg) by mouth today, then take 1 tab daily for 4 days.    Plan:  Exam benign today, vitals normal  Added zpak to take as directed if not improving sxs through the week  Continue inhaler as directed and complete steroids as directed  OTC meds as directed for cough  Pcp follow up this week if not " improving or worsening  ER visit anytime 24/7 for acute worsening or changing condtiion      Patient disposition: Home    Electronically signed by Radha Sprague PA-C  4:09 PM

## 2025-04-08 ENCOUNTER — OFFICE VISIT (OUTPATIENT)
Dept: URGENT CARE | Age: 60
End: 2025-04-08
Payer: COMMERCIAL

## 2025-04-08 VITALS
DIASTOLIC BLOOD PRESSURE: 83 MMHG | WEIGHT: 290 LBS | BODY MASS INDEX: 35.31 KG/M2 | RESPIRATION RATE: 17 BRPM | SYSTOLIC BLOOD PRESSURE: 125 MMHG | TEMPERATURE: 97.6 F | HEIGHT: 76 IN | OXYGEN SATURATION: 95 % | HEART RATE: 100 BPM

## 2025-04-08 DIAGNOSIS — R10.30 LOWER ABDOMINAL PAIN: ICD-10-CM

## 2025-04-08 DIAGNOSIS — S39.011A STRAIN OF ABDOMINAL MUSCLE, INITIAL ENCOUNTER: Primary | ICD-10-CM

## 2025-04-08 LAB
POC APPEARANCE, URINE: CLEAR
POC BILIRUBIN, URINE: NEGATIVE
POC BLOOD, URINE: NEGATIVE
POC COLOR, URINE: YELLOW
POC GLUCOSE, URINE: NEGATIVE MG/DL
POC KETONES, URINE: NEGATIVE MG/DL
POC LEUKOCYTES, URINE: NEGATIVE
POC NITRITE,URINE: NEGATIVE
POC PH, URINE: 6 PH
POC PROTEIN, URINE: NEGATIVE MG/DL
POC SPECIFIC GRAVITY, URINE: >=1.03
POC UROBILINOGEN, URINE: 0.2 EU/DL

## 2025-04-08 PROCEDURE — 3079F DIAST BP 80-89 MM HG: CPT | Performed by: PHYSICIAN ASSISTANT

## 2025-04-08 PROCEDURE — 1036F TOBACCO NON-USER: CPT | Performed by: PHYSICIAN ASSISTANT

## 2025-04-08 PROCEDURE — 81003 URINALYSIS AUTO W/O SCOPE: CPT | Performed by: PHYSICIAN ASSISTANT

## 2025-04-08 PROCEDURE — 3008F BODY MASS INDEX DOCD: CPT | Performed by: PHYSICIAN ASSISTANT

## 2025-04-08 PROCEDURE — 3074F SYST BP LT 130 MM HG: CPT | Performed by: PHYSICIAN ASSISTANT

## 2025-04-08 PROCEDURE — 99214 OFFICE O/P EST MOD 30 MIN: CPT | Performed by: PHYSICIAN ASSISTANT

## 2025-04-08 RX ORDER — TIZANIDINE HYDROCHLORIDE 4 MG/1
4 CAPSULE, GELATIN COATED ORAL EVERY 8 HOURS PRN
Qty: 15 CAPSULE | Refills: 0 | Status: SHIPPED | OUTPATIENT
Start: 2025-04-08 | End: 2025-04-13

## 2025-04-08 ASSESSMENT — ENCOUNTER SYMPTOMS
CONSTIPATION: 0
CHILLS: 0
COUGH: 1
VOMITING: 0
ABDOMINAL PAIN: 1
DIARRHEA: 0
FEVER: 0

## 2025-04-08 ASSESSMENT — PATIENT HEALTH QUESTIONNAIRE - PHQ9
2. FEELING DOWN, DEPRESSED OR HOPELESS: NOT AT ALL
1. LITTLE INTEREST OR PLEASURE IN DOING THINGS: NOT AT ALL
SUM OF ALL RESPONSES TO PHQ9 QUESTIONS 1 AND 2: 0

## 2025-04-08 NOTE — PATIENT INSTRUCTIONS
ER if worsening pain, masses or bulges, fever, diarrhea, blood in stool, vomiting   Follow up with PCP

## 2025-04-08 NOTE — PROGRESS NOTES
Subjective   Patient ID: Kenn Powell is a 60 y.o. male. They present today with a chief complaint of Abdominal Pain (X1 week lower abdominal pain that feels worse on  the left side patient has a history of an umbilical hernia ).    History of Present Illness  Patient is a 60 year old male presenting for evaluation of abdominal pain. Symptoms have been present for the last week. Pain across lower abdomen but worse on the left side.  Pain is pressure like sensation, worse when he has to urinate or have a bowel movement feels like more of a stabbing pain. Pain also worse with bending or twisting or coughing. Denies fever, chills, diarrhea or constipation. Denies blood in stool. Denies vomiting. History of umbilical hernia repaired in 2022. Denies masses or bulges. Has been coughing a lot over the last couple months since having influenza but has been checking for hernia and has not noticed recurrence. History of diverticulitis but felt different the last time he had it, had diarrhea and bloody stools as well as fever.  Concerned for pulled muscle from coughing.       History provided by:  Patient   used: No    Abdominal Pain  Pertinent negatives include no constipation, diarrhea, fever or vomiting.       Past Medical History  Allergies as of 04/08/2025 - Reviewed 04/08/2025   Allergen Reaction Noted    Amoxicillin Anaphylaxis 08/14/2000    Penicillin Anaphylaxis 01/17/2024       (Not in a hospital admission)       Past Medical History:   Diagnosis Date    Other specified health status     No pertinent past medical history       Past Surgical History:   Procedure Laterality Date    OTHER SURGICAL HISTORY  06/22/2022    Umbilical hernia repair    OTHER SURGICAL HISTORY  06/22/2022    Nasal septoplasty    OTHER SURGICAL HISTORY  06/22/2022    Lithotripsy    OTHER SURGICAL HISTORY  06/22/2022    Colonoscopy        reports that he has quit smoking. His smoking use included cigarettes. He has a 10  "pack-year smoking history. He has never used smokeless tobacco. He reports current alcohol use. Drug use questions deferred to the physician.    Review of Systems  Review of Systems   Constitutional:  Negative for chills and fever.   Respiratory:  Positive for cough.    Gastrointestinal:  Positive for abdominal pain. Negative for constipation, diarrhea and vomiting.                                  Objective    Vitals:    04/08/25 1556   BP: 125/83   BP Location: Left arm   Patient Position: Sitting   BP Cuff Size: Adult   Pulse: 100   Resp: 17   Temp: 36.4 °C (97.6 °F)   TempSrc: Oral   SpO2: 95%   Weight: 132 kg (290 lb)   Height: 1.93 m (6' 4\")     No LMP for male patient.    Physical Exam  Constitutional:       General: He is not in acute distress.     Appearance: Normal appearance. He is obese. He is not ill-appearing or toxic-appearing.   HENT:      Head: Normocephalic. No right periorbital erythema or left periorbital erythema.      Jaw: There is normal jaw occlusion. No trismus.      Right Ear: Tympanic membrane, ear canal and external ear normal. There is no impacted cerumen.      Left Ear: Tympanic membrane, ear canal and external ear normal. There is no impacted cerumen.      Mouth/Throat:      Mouth: Mucous membranes are moist.      Pharynx: No oropharyngeal exudate or posterior oropharyngeal erythema.   Eyes:      General: No scleral icterus.        Right eye: No discharge.         Left eye: No discharge.      Conjunctiva/sclera: Conjunctivae normal.   Neck:      Trachea: Phonation normal.   Cardiovascular:      Rate and Rhythm: Normal rate and regular rhythm.      Heart sounds: Normal heart sounds. No murmur heard.     No friction rub. No gallop.   Pulmonary:      Effort: Pulmonary effort is normal. No respiratory distress.      Breath sounds: Normal breath sounds. No stridor. No wheezing, rhonchi or rales.   Abdominal:      General: Abdomen is flat.      Palpations: Abdomen is soft.      Tenderness: " There is abdominal tenderness (along lower abdomen under panus, no masses or bulges or guarding). There is no right CVA tenderness, left CVA tenderness or guarding.   Neurological:      General: No focal deficit present.      Mental Status: He is alert.      Gait: Gait normal.   Psychiatric:         Mood and Affect: Mood normal.         Behavior: Behavior normal.         Thought Content: Thought content normal.         Procedures    Point of Care Test & Imaging Results from this visit  Results for orders placed or performed in visit on 04/08/25   POCT UA Automated manually resulted   Result Value Ref Range    POC Color, Urine Yellow Straw, Yellow, Light-Yellow    POC Appearance, Urine Clear Clear    POC Glucose, Urine NEGATIVE NEGATIVE mg/dl    POC Bilirubin, Urine NEGATIVE NEGATIVE    POC Ketones, Urine NEGATIVE NEGATIVE mg/dl    POC Specific Gravity, Urine >=1.030 1.005 - 1.035    POC Blood, Urine NEGATIVE NEGATIVE    POC PH, Urine 6.0 No Reference Range Established PH    POC Protein, Urine NEGATIVE NEGATIVE mg/dl    POC Urobilinogen, Urine 0.2 0.2, 1.0 EU/DL    Poc Nitrite, Urine NEGATIVE NEGATIVE    POC Leukocytes, Urine NEGATIVE NEGATIVE      Imaging  No results found.    Cardiology, Vascular, and Other Imaging  No other imaging results found for the past 2 days      Diagnostic study results (if any) were reviewed by Ivette Hurtado PA-C.    Assessment/Plan   Allergies, medications, history, and pertinent labs/EKGs/Imaging reviewed by Ivette Hurtado PA-C.     Medical Decision Making  Patient presenting for abdominal pain for the last week. Hemodynamically stable, afebrile. Abdominal exam without masses or bulges or hernia. Mild tenderness along lower abdomen but no guarding. No associated fever, bowel changes to suggest acute intraabdominal infection such as diverticulitis. Do not suspect obstruction as he has had normal Bms and passing gas.  UA without signs of infection, hematuria, dehydration.  Symptoms  likely abdominal muscle strain from persistent coughing given it is worse with movement and coughing. Advised supportive care at this time but strict ER if worsening pain, vomiting, diarrhea, inability to have bowel movement or pass gas, blood in stool/rectal bleeding, fever.     At time of discharge, patient was clinically well-appearing and appropriate for outpatient management. The patient/parent/guardian was educated regarding diagnosis, supportive care, OTC and Rx medications. The patient/parent/guardian was given the opportunity to ask questions prior to discharge. They verbalized understanding of discussion of treatment plan, expected course of illness and/or injury, indications on when to return to , when to seek further evaluation in ED/call 911, and the need to follow up with PCP and/or specialist as referred. Patient/parent/guardian was provided with work/school documentation if requested. Patient stable upon discharge.     Orders and Diagnoses  Diagnoses and all orders for this visit:  Strain of abdominal muscle, initial encounter  -     tiZANidine (Zanaflex) 4 mg capsule; Take 1 capsule (4 mg) by mouth every 8 hours if needed for muscle spasms for up to 5 days.  Lower abdominal pain  -     POCT UA Automated manually resulted      Medical Admin Record      Patient disposition: Home    Electronically signed by Ivette Hurtado PA-C  5:16 PM

## 2025-07-22 ENCOUNTER — APPOINTMENT (OUTPATIENT)
Dept: PRIMARY CARE | Facility: CLINIC | Age: 60
End: 2025-07-22
Payer: COMMERCIAL

## 2025-09-01 DIAGNOSIS — I10 ACCELERATED HYPERTENSION: ICD-10-CM

## 2025-09-03 RX ORDER — LOSARTAN POTASSIUM AND HYDROCHLOROTHIAZIDE 12.5; 5 MG/1; MG/1
1 TABLET ORAL DAILY
Qty: 90 TABLET | Refills: 3 | OUTPATIENT
Start: 2025-09-03